# Patient Record
Sex: MALE | Race: WHITE | NOT HISPANIC OR LATINO | Employment: FULL TIME | URBAN - METROPOLITAN AREA
[De-identification: names, ages, dates, MRNs, and addresses within clinical notes are randomized per-mention and may not be internally consistent; named-entity substitution may affect disease eponyms.]

---

## 2017-03-05 ENCOUNTER — OFFICE VISIT (OUTPATIENT)
Dept: URGENT CARE | Facility: CLINIC | Age: 37
End: 2017-03-05
Payer: COMMERCIAL

## 2017-03-05 VITALS
DIASTOLIC BLOOD PRESSURE: 76 MMHG | BODY MASS INDEX: 31.83 KG/M2 | WEIGHT: 235 LBS | RESPIRATION RATE: 14 BRPM | SYSTOLIC BLOOD PRESSURE: 118 MMHG | HEIGHT: 72 IN | HEART RATE: 74 BPM | TEMPERATURE: 98.6 F | OXYGEN SATURATION: 97 %

## 2017-03-05 DIAGNOSIS — L08.9 LOCAL SKIN INFECTION: ICD-10-CM

## 2017-03-05 PROCEDURE — 99204 OFFICE O/P NEW MOD 45 MIN: CPT | Performed by: PHYSICIAN ASSISTANT

## 2017-03-05 RX ORDER — SULFAMETHOXAZOLE AND TRIMETHOPRIM 800; 160 MG/1; MG/1
1 TABLET ORAL 2 TIMES DAILY
Qty: 20 TAB | Refills: 0 | Status: SHIPPED | OUTPATIENT
Start: 2017-03-05 | End: 2017-03-15

## 2017-03-05 NOTE — MR AVS SNAPSHOT
Cortez Watters   3/5/2017 12:45 PM   Office Visit   MRN: [id number]    Department:  [facility]   Dept Phone:  [phone]    Description:  Male : 1980   Provider:  Aleisha Castellanos PA-C           Reason for Visit     Otalgia l ear swelling x 4 days .      Allergies as of 3/5/2017     No Known Allergies      You were diagnosed with     Local skin infection   [[id number]]         Vital Signs     Blood Pressure Pulse Temperature Respirations Height Weight    118/76 mmHg 74 37 °C (98.6 °F) 14 1.829 m (6') 106.595 kg (235 lb)    Body Mass Index Oxygen Saturation                31.86 kg/m2 97%          Basic Information     Date Of Birth Sex Race Ethnicity Preferred Language    1980 Male White Non- English      Problem List              ICD-10-CM Priority Class Noted - Resolved    Exercise-induced bronchospasm J45.990   Unknown - Present    Benign heart murmur R01.0   Unknown - Present    Migraine headache G43.909   2013 - Present      Health Maintenance        Date Due Completion Dates    IMM DTaP/Tdap/Td Vaccine (1 - Tdap) 1999 ---    IMM INFLUENZA (1) 2016 ---            Current Immunizations     No immunizations on file.      Below and/or attached are the medications your provider expects you to take. Review all of your home medications and newly ordered medications with your provider and/or pharmacist. Follow medication instructions as directed by your provider and/or pharmacist. Please keep your medication list with you and share with your provider. Update the information when medications are discontinued, doses are changed, or new medications (including over-the-counter products) are added; and carry medication information at all times in the event of emergency situations     Allergies:  No Known Allergies          Medications  Valid as of: 2017 -  2:03 PM    Generic Name Brand Name Tablet Size Instructions for use    Albuterol Sulfate (Aero  Soln) albuterol 108 (90 BASE) MCG/ACT Inhale 2 Puffs by mouth every four hours as needed for Shortness of Breath.        Budesonide-Formoterol Fumarate (Aerosol) SYMBICORT 80-4.5 MCG/ACT Inhale 2 Puffs by mouth 2 Times a Day.        Loratadine (Tab) CLARITIN 10 MG Take 10 mg by mouth every day.        Naproxen (Tab) NAPROSYN 500 MG Take 1 Tab by mouth 1 time daily as needed.        Omeprazole (CAPSULE DELAYED RELEASE) PRILOSEC 20 MG Take 1 Cap by mouth every day.        Sulfamethoxazole-Trimethoprim (Tab) BACTRIM -160 MG Take 1 Tab by mouth 2 times a day for 10 days.        SUMAtriptan Succinate (Tab) IMITREX 100 MG Take 1 Tab by mouth Once PRN for Migraine for 1 dose.        SUMAtriptan Succinate (Tab) IMITREX 100 MG Take 1 Tab by mouth Once PRN for Migraine for 1 dose.        Sumatriptan-Naproxen Sodium (Tab) Sumatriptan-Naproxen Sodium  MG Take 1 Tab by mouth 1 time daily as needed.        .                 Medicines prescribed today were sent to:     [facility] - [place], [place] - [address]    [address] [place] [address]    Phone: [phone] Fax: [phone]    Open 24 Hours?: No      Medication refill instructions:       If your prescription bottle indicates you have medication refills left, it is not necessary to call your provider’s office. Please contact your pharmacy and they will refill your medication.    If your prescription bottle indicates you do not have any refills left, you may request refills at any time through one of the following ways: The online [employer] system (except Urgent Care), by calling your provider’s office, or by asking your pharmacy to contact your provider’s office with a refill request. Medication refills are processed only during regular business hours and may not be available until the next business day. Your provider may request additional information or to have a follow-up visit with you prior to refilling your medication.   *Please Note: Medication refills are  assigned a new Rx number when refilled electronically. Your pharmacy may indicate that no refills were authorized even though a new prescription for the same medication is available at the pharmacy. Please request the medicine by name with the pharmacy before contacting your provider for a refill.           MyChart Access Code: Activation code not generated  Current [facility] Status: Active

## 2017-03-08 ASSESSMENT — ENCOUNTER SYMPTOMS
SORE THROAT: 0
FEVER: 0
COUGH: 0

## 2017-03-08 NOTE — PROGRESS NOTES
Subjective:      Cortez Watters is a 36 y.o. male who presents with Otalgia    PMH:  has a past medical history of Benign heart murmur (congenital); Exercise-induced bronchospasm; and Migraine.  MEDS:   Current outpatient prescriptions:   •  sulfamethoxazole-trimethoprim (BACTRIM DS) 800-160 MG tablet, Take 1 Tab by mouth 2 times a day for 10 days., Disp: 20 Tab, Rfl: 0  •  budesonide-formoterol (SYMBICORT) 80-4.5 MCG/ACT AERO, Inhale 2 Puffs by mouth 2 Times a Day., Disp: , Rfl:   •  loratadine (CLARITIN) 10 MG TABS, Take 10 mg by mouth every day., Disp: , Rfl:   •  omeprazole (PRILOSEC) 20 MG CPDR, Take 1 Cap by mouth every day., Disp: 30 Cap, Rfl: 2  •  sumatriptan (IMITREX) 100 MG tablet, Take 1 Tab by mouth Once PRN for Migraine for 1 dose., Disp: 10 Tab, Rfl: 3  •  naproxen (NAPROSYN) 500 MG TABS, Take 1 Tab by mouth 1 time daily as needed., Disp: 30 Tab, Rfl: 2  •  sumatriptan (IMITREX) 100 MG tablet, Take 1 Tab by mouth Once PRN for Migraine for 1 dose., Disp: 10 Tab, Rfl: 3  •  Sumatriptan-Naproxen Sodium (TREXIMET)  MG TABS, Take 1 Tab by mouth 1 time daily as needed., Disp: 9 Tab, Rfl: 12  •  albuterol (VENTOLIN HFA) 108 (90 BASE) MCG/ACT AERS, Inhale 2 Puffs by mouth every four hours as needed for Shortness of Breath., Disp: 1 Inhaler, Rfl: 3  ALLERGIES: No Known Allergies  SURGHX: History reviewed. No pertinent past surgical history.  SOCHX:  reports that he has never smoked. He does not have any smokeless tobacco history on file. He reports that he drinks alcohol.  FH: family history includes Cancer in his paternal uncle; Heart Disease in his mother. There is no history of Diabetes or Stroke. Reviewed with patient/family. Not pertinent to this complaint.            HPI Comments: Patient presents with:  Otalgia: l ear swelling x 4 days .        Otalgia   There is pain in the right ear. This is a new problem. The current episode started in the past 7 days. The problem occurs  constantly. The problem has been gradually worsening. There has been no fever. The pain is at a severity of 4/10. Associated symptoms include a rash. Pertinent negatives include no coughing, ear discharge, hearing loss or sore throat. He has tried nothing for the symptoms. The treatment provided no relief. There is no history of a chronic ear infection.       Review of Systems   Constitutional: Negative for fever.   HENT: Positive for ear pain. Negative for congestion, ear discharge, hearing loss and sore throat.    Respiratory: Negative for cough.    Skin: Positive for rash.   All other systems reviewed and are negative.         Objective:     /76 mmHg  Pulse 74  Temp(Src) 37 °C (98.6 °F)  Resp 14  Ht 1.829 m (6')  Wt 106.595 kg (235 lb)  BMI 31.86 kg/m2  SpO2 97%     Physical Exam   Constitutional: He is oriented to person, place, and time. He appears well-developed and well-nourished. No distress.   HENT:   Head: Normocephalic. Head is without Leblanc's sign and without contusion.       Right Ear: Tympanic membrane normal. There is swelling and tenderness. No drainage. No middle ear effusion. No decreased hearing is noted.   Left Ear: Tympanic membrane normal. No decreased hearing is noted.   Ears:    Mouth/Throat: Oropharynx is clear and moist.   Eyes: Conjunctivae and EOM are normal. Pupils are equal, round, and reactive to light.   Neck: Normal range of motion. Neck supple.   Cardiovascular: Normal rate, regular rhythm and normal heart sounds.    Pulmonary/Chest: Effort normal and breath sounds normal.   Musculoskeletal: Normal range of motion.   Neurological: He is alert and oriented to person, place, and time.   Skin: Skin is warm and dry.   Psychiatric: He has a normal mood and affect.   Nursing note and vitals reviewed.              Assessment/Plan:     1. Local skin infection     - sulfamethoxazole-trimethoprim (BACTRIM DS) 800-160 MG tablet; Take 1 Tab by mouth 2 times a day for 10 days.   Dispense: 20 Tab; Refill: 0    PT can use warm compress on affected ear for relief of symptoms.     PT should follow up with PCP in 1-2 days for re-evaluation if symptoms have not improved.  Discussed red flags and reasons to return to UC or ED.  Pt and/or family verbalized understanding of diagnosis and follow up instructions and declined  informational handout on diagnosis.  PT discharged.

## 2017-04-24 ENCOUNTER — RX ONLY (OUTPATIENT)
Age: 37
Setting detail: RX ONLY
End: 2017-04-24

## 2017-05-08 PROBLEM — D49.2 NEOPLASM OF UNSPECIFIED BEHAVIOR OF BONE, SOFT TISSUE, AND SKIN: Status: RESOLVED | Noted: 2017-04-24 | Resolved: 2017-05-08

## 2017-05-26 PROBLEM — D49.2 NEOPLASM OF UNSPECIFIED BEHAVIOR OF BONE, SOFT TISSUE, AND SKIN: Status: ACTIVE | Noted: 2017-05-26

## 2019-05-02 ENCOUNTER — TELEPHONE (OUTPATIENT)
Dept: SCHEDULING | Facility: IMAGING CENTER | Age: 39
End: 2019-05-02

## 2019-05-24 ENCOUNTER — OFFICE VISIT (OUTPATIENT)
Dept: MEDICAL GROUP | Facility: MEDICAL CENTER | Age: 39
End: 2019-05-24
Payer: OTHER GOVERNMENT

## 2019-05-24 VITALS
OXYGEN SATURATION: 97 % | HEIGHT: 72 IN | DIASTOLIC BLOOD PRESSURE: 90 MMHG | WEIGHT: 243.6 LBS | RESPIRATION RATE: 16 BRPM | BODY MASS INDEX: 33 KG/M2 | TEMPERATURE: 98.5 F | SYSTOLIC BLOOD PRESSURE: 140 MMHG | HEART RATE: 64 BPM

## 2019-05-24 DIAGNOSIS — R03.0 ELEVATED BLOOD PRESSURE READING: ICD-10-CM

## 2019-05-24 DIAGNOSIS — R21 RASH: ICD-10-CM

## 2019-05-24 DIAGNOSIS — Z00.00 HEALTHCARE MAINTENANCE: ICD-10-CM

## 2019-05-24 DIAGNOSIS — L29.9 SCALP ITCH: ICD-10-CM

## 2019-05-24 DIAGNOSIS — J02.9 SORE THROAT: ICD-10-CM

## 2019-05-24 DIAGNOSIS — H04.129 DRY EYE: ICD-10-CM

## 2019-05-24 LAB
INT CON NEG: NEGATIVE
INT CON POS: POSITIVE
S PYO AG THROAT QL: NEGATIVE

## 2019-05-24 PROCEDURE — 87880 STREP A ASSAY W/OPTIC: CPT | Performed by: FAMILY MEDICINE

## 2019-05-24 PROCEDURE — 99214 OFFICE O/P EST MOD 30 MIN: CPT | Performed by: FAMILY MEDICINE

## 2019-05-24 RX ORDER — KETOCONAZOLE 20 MG/G
CREAM TOPICAL
Qty: 1 TUBE | Refills: 1 | Status: SHIPPED | OUTPATIENT
Start: 2019-05-24

## 2019-05-24 RX ORDER — TRIAMCINOLONE ACETONIDE 5 MG/G
OINTMENT TOPICAL
Qty: 1 TUBE | Refills: 1 | Status: SHIPPED | OUTPATIENT
Start: 2019-05-24 | End: 2021-03-25

## 2019-05-24 RX ORDER — BEPOTASTINE BESILATE 15 MG/ML
SOLUTION/ DROPS OPHTHALMIC
COMMUNITY

## 2019-05-24 RX ORDER — ATORVASTATIN CALCIUM 10 MG/1
10 TABLET, FILM COATED ORAL NIGHTLY
COMMUNITY
End: 2020-01-13 | Stop reason: SDUPTHER

## 2019-05-24 ASSESSMENT — PATIENT HEALTH QUESTIONNAIRE - PHQ9: CLINICAL INTERPRETATION OF PHQ2 SCORE: 0

## 2019-05-24 NOTE — ASSESSMENT & PLAN NOTE
Discoid rash on right dorsal hand. Started in Dec 2018 while he was in [place]. He was having significant allergies and issues with is skin at that time. [facility] with no improvement. Also gets similar areas on legs, abd, scrutum and ear. However, no other rashes currently other than his hand.

## 2019-05-24 NOTE — ASSESSMENT & PLAN NOTE
148/100 on initial check.   Repeat was 140/90.   Patient has cuff at home.   No chest pain, SOB or headache.

## 2019-05-24 NOTE — PROGRESS NOTES
Subjective:     CC:  Diagnoses of Rash, Sore throat, Scalp itch, Healthcare maintenance, Elevated blood pressure reading, and Dry eye were pertinent to this visit.    HISTORY OF THE PRESENT ILLNESS: Patient is a 38 y.o. male who presents to the clinic today to establish care.    Rash  Discoid rash on right dorsal hand.  Started as erythematous, now scaly and very itchy.  Gets blisters every now and then from itching.  Started in Dec 2018 while he was in [place]. He was having significant allergies and issues with his skin at that time. [facility] with no improvement. Also gets similar areas on legs, abd, scrutum and ear.    Sore throat  Started 2 days ago. Assoc with slight cough and congestion. No fevers or chills. No sick contacts. He has been running outside recently. He does have a h/o seasonal allergies. Currently on loratadine with improvement, allergies are well controlled.     Scalp itch  Gets very itchy, gets red bumps and bulges as well. Currently using a 2 in 1 shampoo- pantene.     Healthcare maintenance  [facility]   States he is up-to-date on immunizations.    Elevated blood pressure reading  148/100 on initial check.   Repeat was 140/90.   Patient has cuff at home.   No chest pain, SOB or headache.       Allergies: Patient has no known allergies.    Current Outpatient Prescriptions Ordered in [facility]   Medication Sig Dispense Refill   • atorvastatin (LIPITOR) 10 MG Tab Take 10 mg by mouth every evening.     • Lifitegrast (XIIDRA) 5 % Solution by Ophthalmic route.     • Bepotastine Besilate (BEPREVE) 1.5 % Solution by Ophthalmic route.     • ketoconazole (NIZORAL) 2 % Cream Apply small amount on affected areas BID x 2 weeks or until rash resolves. 1 Tube 1   • sumatriptan (IMITREX) 100 MG tablet Take 1 Tab by mouth Once PRN for Migraine for 1 dose. 10 Tab 3   • budesonide-formoterol (SYMBICORT) 80-4.5 MCG/ACT AERO Inhale 2  Puffs by mouth 2 Times a Day.     • loratadine (CLARITIN) 10 MG TABS Take 10 mg by mouth every day.     • omeprazole (PRILOSEC) 20 MG CPDR Take 1 Cap by mouth every day. 30 Cap 2   • albuterol (VENTOLIN HFA) 108 (90 BASE) MCG/ACT AERS Inhale 2 Puffs by mouth every four hours as needed for Shortness of Breath. 1 Inhaler 3     No current Epic-ordered facility-administered medications on file.        Past Medical History:   Diagnosis Date   • Asthma    • Benign heart murmur congenital   • Exercise-induced bronchospasm    • Hyperlipidemia    • Migraine        Past Surgical History:   Procedure Laterality Date   • CYST EXCISION     • EYE SURGERY      LASIK       Social History   Substance Use Topics   • Smoking status: Never Smoker   • Smokeless tobacco: Never Used   • Alcohol use Yes      Comment: occasionally       Social History     Social History Narrative   • No narrative on file       Family History   Problem Relation Age of Onset   • Heart Disease Mother         age 50   • Heart Attack Mother    • Cancer Paternal Uncle    • No Known Problems Father    • Diabetes Neg Hx    • Stroke Neg Hx        Health Maintenance: Completed    ROS:   Gen: no fevers/chills, no changes in weight  Eyes: no changes in vision  ENT: + sore throat, no hearing loss, no bloody nose  Pulm: no sob, no cough  CV: no chest pain, no palpitations  GI: no nausea/vomiting, no diarrhea  : no dysuria  MSk: no myalgias  Skin:+ rash  Neuro: no headaches, no numbness/tingling  Heme/Lymph: no easy bruising             Objective:       Exam: /90 (BP Location: Right arm, Patient Position: Sitting, BP Cuff Size: Adult)   Pulse 64   Temp 36.9 °C (98.5 °F) (Temporal)   Resp 16   Ht 1.829 m (6')   Wt 110.5 kg (243 lb 9.6 oz)   SpO2 97%  Body mass index is 33.04 kg/m².    General: Normal appearing. No distress.  HEENT: conjunctiva clear, lids without ptosis, pupils equal  and reactive to light, ears normal shape and contour, canals are clear  bilaterally, TMs clear, erythema noted in the oropharynx, hypertrophy of the bilateral tonsils, no exudate.  Neck: Supple without masses. Thyroid is not enlarged.  Pulmonary: Clear to ausculation.  Normal effort. No rales, ronchi, or wheezing.  Cardiovascular: Regular rate and rhythm, no murmur. No LE edema  Abdomen: Soft, nontender, nondistended. No masses or hernias noted.  Neurologic: Grossly normal, no focal deficits  Lymph: No cervical or supraclavicular lymph nodes are palpable  Skin: Warm and dry.  Discoid rash on the dorsum of his right hand with slight erythema and scaling.  Another small well-circumscribed area approximately 1 cm x 0.5 cm on his left flank that is similar in appearance.  Musculoskeletal: Normal gait and station.  Psych: Normal mood and affect. Alert and oriented x3. Judgment and insight is normal.      Assessment & Plan:   38 y.o. male with the following -    1. Rash  New problem.  The rash appears to be eczematous in nature.  He had no improvement with triamcinolone ointment however, this was low dose.  I do wonder if this may possibly be fungal in nature.  Plan to try ketoconazole for 2 weeks, if no improvement we will try stronger steroid cream.  Plan to follow-up in 6 weeks.  Referral sent to dermatology.  - REFERRAL TO DERMATOLOGY  - ketoconazole (NIZORAL) 2 % Cream; Apply small amount on affected areas BID x 2 weeks or until rash resolves.  Dispense: 1 Tube; Refill: 1  - Triamcinolone 0.5%    2. Sore throat  New problem.  Edema and erythema noted on bilateral tonsils, no exudate.  Strep was negative.  Advised this is likely a viral infection, advised per therapy.  Return precautions given.  - POCT Rapid Strep A    3. Scalp itch  New problem.  Likely seborrheic dermatitis.  Advised trying to use a shampoo with selenium in it.  To follow-up in 6 weeks.    4. Healthcare maintenance  Patient reports he is up-to-date on immunizations and [facility]  We will  attempt to get these records.    5. Elevated blood pressure reading  New problem.  Blood pressure elevated today at 140/90.  Advised patient to start checking blood pressures at home.  We also discussed diet and exercise.  He is already exercising regularly but reports a somewhat poor diet.  Plan to follow-up in 6 weeks.    6. Dry eye    - REFERRAL TO OPHTHALMOLOGY      Return in about 6 weeks (around 7/5/2019).    Please note that this dictation was created using voice recognition software. I have made every reasonable attempt to correct obvious errors, but I expect that there are errors of grammar and possibly content that I did not discover before finalizing the note.

## 2019-05-24 NOTE — ASSESSMENT & PLAN NOTE
Started 2 days ago. Assoc with slight cough and congestion. No fevers or chills. No sick contacts. He has been running outside recently. He does have a h/o seasonal allergies. Currently on loratadine with improvement, allergies are well controlled.

## 2019-07-15 ENCOUNTER — OFFICE VISIT (OUTPATIENT)
Dept: MEDICAL GROUP | Facility: MEDICAL CENTER | Age: 39
End: 2019-07-15
Payer: OTHER GOVERNMENT

## 2019-07-15 VITALS
OXYGEN SATURATION: 100 % | HEART RATE: 65 BPM | WEIGHT: 244.2 LBS | BODY MASS INDEX: 33.08 KG/M2 | HEIGHT: 72 IN | DIASTOLIC BLOOD PRESSURE: 90 MMHG | SYSTOLIC BLOOD PRESSURE: 132 MMHG | TEMPERATURE: 97.8 F

## 2019-07-15 DIAGNOSIS — G43.909 MIGRAINE WITHOUT STATUS MIGRAINOSUS, NOT INTRACTABLE, UNSPECIFIED MIGRAINE TYPE: ICD-10-CM

## 2019-07-15 DIAGNOSIS — R21 RASH: ICD-10-CM

## 2019-07-15 DIAGNOSIS — J45.40 MODERATE PERSISTENT ASTHMA WITHOUT COMPLICATION: ICD-10-CM

## 2019-07-15 PROCEDURE — 99214 OFFICE O/P EST MOD 30 MIN: CPT | Performed by: FAMILY MEDICINE

## 2019-07-15 NOTE — PROGRESS NOTES
Subjective:     CC: Diagnoses of Rash, Migraine without status migrainosus, not intractable, unspecified migraine type, and Moderate persistent asthma without complication were pertinent to this visit.    HPI: Patient is a 38 y.o. male established patient who presents today to follow-up.  He also requests a letter for the [employer] stating that he is fit for duty.      Rash  Chronic problem.  Significant improvement with triamcinolone 0.5%. Not having to use the triamcinolone. Has been cleared up for a while now.     Migraine headache  Chronic problem.  The patient generally uses over-the-counter Advil.  If this is not helpful he does have a prescription for sumatriptan, he only needs this rarely.  He feels his migraines are essentially well controlled.    Moderate persistent asthma without complication  Chronic problem.  Well-controlled.  Patient denies any shortness of breath or wheezing.  He is currently on Symbicort 2 puffs daily.  He reports he is able to exercise without issue when he is taking his medications.      Past Medical History:   Diagnosis Date   • Asthma    • Benign heart murmur congenital   • Exercise-induced bronchospasm    • Hyperlipidemia    • Migraine        Social History   Substance Use Topics   • Smoking status: Never Smoker   • Smokeless tobacco: Never Used   • Alcohol use Yes      Comment: occasionally       Current Outpatient Prescriptions Ordered in [facility]   Medication Sig Dispense Refill   • atorvastatin (LIPITOR) 10 MG Tab Take 10 mg by mouth every evening.     • budesonide-formoterol (SYMBICORT) 80-4.5 MCG/ACT AERO Inhale 2 Puffs by mouth 2 Times a Day.     • loratadine (CLARITIN) 10 MG TABS Take 10 mg by mouth every day.     • omeprazole (PRILOSEC) 20 MG CPDR Take 1 Cap by mouth every day. 30 Cap 2   • Lifitegrast (XIIDRA) 5 % Solution by Ophthalmic route.     • Bepotastine Besilate (BEPREVE) 1.5 % Solution by Ophthalmic route.     • ketoconazole (NIZORAL) 2 % Cream Apply small  "amount on affected areas BID x 2 weeks or until rash resolves. 1 Tube 1   • triamcinolone (ARISTOCORT) 0.5 % ointment Apply small amount to affected areas BID for up to 10 days as needed for rash 1 Tube 1   • sumatriptan (IMITREX) 100 MG tablet Take 1 Tab by mouth Once PRN for Migraine for 1 dose. (Patient not taking: Reported on 7/15/2019) 10 Tab 3   • albuterol (VENTOLIN HFA) 108 (90 BASE) MCG/ACT AERS Inhale 2 Puffs by mouth every four hours as needed for Shortness of Breath. 1 Inhaler 3     No current Epic-ordered facility-administered medications on file.        Allergies:  Patient has no known allergies.    Health Maintenance: Completed    ROS:  Pulm: no sob, no cough  CV: no chest pain, no palpitations      Objective:       Exam:  /90 (BP Location: Left arm, Patient Position: Sitting, BP Cuff Size: Adult long)   Pulse 65   Temp 36.6 °C (97.8 °F) (Temporal)   Ht 1.829 m (6' 0.01\")   Wt 110.8 kg (244 lb 3.2 oz)   SpO2 100%   BMI 33.11 kg/m²  Body mass index is 33.11 kg/m².    General: Normal appearing. No distress.  HEAD: NCAT  EYES: conjunctiva clear, lids without ptosis, pupils equal and reactive to light  EARS: ears normal shape and contour.  MOUTH: normal dentition   Neck:  Normal ROM  Pulmonary: Normal effort. Normal respiratory rate.  Cardiovascular: Well perfused. No LE edema  Neurologic: Grossly normal, no focal deficits  Skin: Warm and dry.  No obvious lesions.  Musculoskeletal: Normal gait and station.   Psych: Normal mood and affect. Alert and oriented x3. Judgment and insight is normal.      Assessment & Plan:     38 y.o. male with the following -     1. Rash  Chronic problem, now improved following triamcinolone 0.5% ointment.  Likely due to eczema.    2. Migraine without status migrainosus, not intractable, unspecified migraine type  Chronic problem.  Generally controlled with Advil or sumatriptan if needed.    3. Moderate persistent asthma without complication  Chronic problem.  " Generally well controlled on current prescriptions.    Letter written today for the [employer] regarding his ability to serve.  Patient states he is well, medical problems are well controlled.    Return in about 1 year (around 7/15/2020), or if symptoms worsen or fail to improve.    Please note that this dictation was created using voice recognition software. I have made every reasonable attempt to correct obvious errors, but I expect that there are errors of grammar and possibly content that I did not discover before finalizing the note.

## 2019-07-15 NOTE — LETTER
[facility]  Renata Hernandez M.D.  [address]  [place] [address]  Fax: [phone]   Authorization for Release/Disclosure of   Protected Health Information   Name: CORTEZ WATTERS : 1980 SSN: [id number]   Address: [address] [place] [address]  [place] [address] Phone:    [phone] (home)    I authorize the entity listed below to release/disclose the PHI below to:   [facility]/Renata Hernandez M.D. and Renata Hernandez M.D.   Provider or Entity Name: [email or web address]     Address   City, State, Zip   Phone:      Fax:     Reason for request: continuity of care   Information to be released:    [  ] LAST COLONOSCOPY,  including any PATH REPORT and follow-up  [  ] LAST FIT/COLOGUARD RESULT [  ] LAST DEXA  [  ] LAST MAMMOGRAM  [  ] LAST PAP  [  ] LAST LABS [  ] RETINA EXAM REPORT  [  ] IMMUNIZATION RECORDS  [xxxxx  ] Release all info      [  ] Check here and initial the line next to each item to release ALL health information INCLUDING  _____ Care and treatment for drug and / or alcohol abuse  _____ HIV testing, infection status, or AIDS  _____ Genetic Testing    DATES OF SERVICE OR TIME PERIOD TO BE DISCLOSED: _____________  I understand and acknowledge that:  * This Authorization may be revoked at any time by you in writing, except if your health information has already been used or disclosed.  * Your health information that will be used or disclosed as a result of you signing this authorization could be re-disclosed by the recipient. If this occurs, your re-disclosed health information may no longer be protected by State or Federal laws.  * You may refuse to sign this Authorization. Your refusal will not affect your ability to obtain treatment.  * This Authorization becomes effective upon signing and will  on (date) __________.      If no date is indicated, this Authorization will  one (1) year from the signature date.    Name: Cortez Watters    Signature:   Date:     7/15/2019       PLEASE FAX REQUESTED RECORDS BACK TO: [phone]

## 2019-07-15 NOTE — LETTER
July 15, 2019    To Whom It May Concern:         This is confirmation that Cortez Watters attended his scheduled appointment with Renata Hernandez M.D. on 7/15/19.    He did have a rash that was likely due to eczema, this resolved with 0.5% triamcinolone ointment. He is now doing well.     The patient has asthma that was exacerbated by poor air quality in [facility]. He is now doing well, has no further shortness of breath or wheezing on his maintenance inhaler.    The patient gets occasional headaches, improve well with over-the-counter Ibuprofen usually, otherwise is able to use Sumatriptan for headaches.    The patient does not feel that he is in harm's way any longer. Reports a normal mood and his occasional irritability seems to be at baseline.            If you have any questions please do not hesitate to call me at the phone number listed below.    Sincerely,          Renata Hernandez M.D.  [phone]

## 2019-07-15 NOTE — ASSESSMENT & PLAN NOTE
Chronic problem.  Well-controlled.  Patient denies any shortness of breath or wheezing.  He is currently on Symbicort 2 puffs daily.  He reports he is able to exercise without issue when he is taking his medications.

## 2019-07-15 NOTE — ASSESSMENT & PLAN NOTE
Chronic problem.  Significant improvement with triamcinolone 0.5%. Not having to use the triamcinolone. Has been cleared up for a while now.

## 2019-07-15 NOTE — ASSESSMENT & PLAN NOTE
Chronic problem.  The patient generally uses over-the-counter Advil.  If this is not helpful he does have a prescription for sumatriptan, he only needs this rarely.  He feels his migraines are essentially well controlled.

## 2019-09-27 ENCOUNTER — PATIENT MESSAGE (OUTPATIENT)
Dept: MEDICAL GROUP | Facility: MEDICAL CENTER | Age: 39
End: 2019-09-27

## 2019-09-27 DIAGNOSIS — R06.83 SNORING: ICD-10-CM

## 2019-09-27 DIAGNOSIS — J02.9 SORE THROAT: ICD-10-CM

## 2020-01-06 RX ORDER — FLUTICASONE PROPIONATE 50 MCG
2 SPRAY, SUSPENSION (ML) NASAL DAILY
COMMUNITY

## 2020-01-06 RX ORDER — M-VIT,TX,IRON,MINS/CALC/FOLIC 27MG-0.4MG
1 TABLET ORAL DAILY
COMMUNITY

## 2020-01-07 ENCOUNTER — ANESTHESIA (OUTPATIENT)
Dept: SURGERY | Facility: MEDICAL CENTER | Age: 40
End: 2020-01-07
Payer: OTHER GOVERNMENT

## 2020-01-07 ENCOUNTER — HOSPITAL ENCOUNTER (OUTPATIENT)
Facility: MEDICAL CENTER | Age: 40
End: 2020-01-08
Attending: OTOLARYNGOLOGY | Admitting: OTOLARYNGOLOGY
Payer: OTHER GOVERNMENT

## 2020-01-07 ENCOUNTER — ANESTHESIA EVENT (OUTPATIENT)
Dept: SURGERY | Facility: MEDICAL CENTER | Age: 40
End: 2020-01-07
Payer: OTHER GOVERNMENT

## 2020-01-07 DIAGNOSIS — G89.18 POSTOPERATIVE PAIN: ICD-10-CM

## 2020-01-07 LAB — PATHOLOGY CONSULT NOTE: NORMAL

## 2020-01-07 PROCEDURE — A9270 NON-COVERED ITEM OR SERVICE: HCPCS | Performed by: ANESTHESIOLOGY

## 2020-01-07 PROCEDURE — G0378 HOSPITAL OBSERVATION PER HR: HCPCS

## 2020-01-07 PROCEDURE — 700111 HCHG RX REV CODE 636 W/ 250 OVERRIDE (IP)

## 2020-01-07 PROCEDURE — 700105 HCHG RX REV CODE 258: Performed by: OTOLARYNGOLOGY

## 2020-01-07 PROCEDURE — 88304 TISSUE EXAM BY PATHOLOGIST: CPT

## 2020-01-07 PROCEDURE — 501838 HCHG SUTURE GENERAL: Performed by: OTOLARYNGOLOGY

## 2020-01-07 PROCEDURE — 160035 HCHG PACU - 1ST 60 MINS PHASE I: Performed by: OTOLARYNGOLOGY

## 2020-01-07 PROCEDURE — 700111 HCHG RX REV CODE 636 W/ 250 OVERRIDE (IP): Performed by: ANESTHESIOLOGY

## 2020-01-07 PROCEDURE — A9270 NON-COVERED ITEM OR SERVICE: HCPCS | Performed by: OTOLARYNGOLOGY

## 2020-01-07 PROCEDURE — 501424 HCHG SPONGE, TONSIL: Performed by: OTOLARYNGOLOGY

## 2020-01-07 PROCEDURE — 700101 HCHG RX REV CODE 250: Performed by: ANESTHESIOLOGY

## 2020-01-07 PROCEDURE — 700102 HCHG RX REV CODE 250 W/ 637 OVERRIDE(OP): Performed by: ANESTHESIOLOGY

## 2020-01-07 PROCEDURE — 700102 HCHG RX REV CODE 250 W/ 637 OVERRIDE(OP): Performed by: OTOLARYNGOLOGY

## 2020-01-07 PROCEDURE — 502573 HCHG PACK, ENT: Performed by: OTOLARYNGOLOGY

## 2020-01-07 PROCEDURE — 500331 HCHG COTTONOID, SURG PATTIE: Performed by: OTOLARYNGOLOGY

## 2020-01-07 PROCEDURE — 160029 HCHG SURGERY MINUTES - 1ST 30 MINS LEVEL 4: Performed by: OTOLARYNGOLOGY

## 2020-01-07 PROCEDURE — 500257: Performed by: OTOLARYNGOLOGY

## 2020-01-07 PROCEDURE — C1894 INTRO/SHEATH, NON-LASER: HCPCS | Performed by: OTOLARYNGOLOGY

## 2020-01-07 PROCEDURE — 160048 HCHG OR STATISTICAL LEVEL 1-5: Performed by: OTOLARYNGOLOGY

## 2020-01-07 PROCEDURE — 160009 HCHG ANES TIME/MIN: Performed by: OTOLARYNGOLOGY

## 2020-01-07 PROCEDURE — 160002 HCHG RECOVERY MINUTES (STAT): Performed by: OTOLARYNGOLOGY

## 2020-01-07 PROCEDURE — 160036 HCHG PACU - EA ADDL 30 MINS PHASE I: Performed by: OTOLARYNGOLOGY

## 2020-01-07 PROCEDURE — 700101 HCHG RX REV CODE 250: Performed by: OTOLARYNGOLOGY

## 2020-01-07 PROCEDURE — 160041 HCHG SURGERY MINUTES - EA ADDL 1 MIN LEVEL 4: Performed by: OTOLARYNGOLOGY

## 2020-01-07 RX ORDER — SCOLOPAMINE TRANSDERMAL SYSTEM 1 MG/1
1 PATCH, EXTENDED RELEASE TRANSDERMAL
Status: DISCONTINUED | OUTPATIENT
Start: 2020-01-07 | End: 2020-01-08 | Stop reason: HOSPADM

## 2020-01-07 RX ORDER — MIDAZOLAM HYDROCHLORIDE 1 MG/ML
INJECTION INTRAMUSCULAR; INTRAVENOUS PRN
Status: DISCONTINUED | OUTPATIENT
Start: 2020-01-07 | End: 2020-01-07 | Stop reason: SURG

## 2020-01-07 RX ORDER — CELECOXIB 200 MG/1
400 CAPSULE ORAL ONCE
Status: COMPLETED | OUTPATIENT
Start: 2020-01-07 | End: 2020-01-07

## 2020-01-07 RX ORDER — HALOPERIDOL 5 MG/ML
1 INJECTION INTRAMUSCULAR
Status: DISCONTINUED | OUTPATIENT
Start: 2020-01-07 | End: 2020-01-07 | Stop reason: HOSPADM

## 2020-01-07 RX ORDER — BUDESONIDE AND FORMOTEROL FUMARATE DIHYDRATE 80; 4.5 UG/1; UG/1
2 AEROSOL RESPIRATORY (INHALATION)
Status: DISCONTINUED | OUTPATIENT
Start: 2020-01-07 | End: 2020-01-08 | Stop reason: HOSPADM

## 2020-01-07 RX ORDER — SODIUM CHLORIDE, SODIUM LACTATE, POTASSIUM CHLORIDE, CALCIUM CHLORIDE 600; 310; 30; 20 MG/100ML; MG/100ML; MG/100ML; MG/100ML
INJECTION, SOLUTION INTRAVENOUS CONTINUOUS
Status: ACTIVE | OUTPATIENT
Start: 2020-01-07 | End: 2020-01-07

## 2020-01-07 RX ORDER — PREDNISONE 20 MG/1
20 TABLET ORAL DAILY
Qty: 5 TAB | Refills: 0 | Status: SHIPPED | OUTPATIENT
Start: 2020-01-07 | End: 2020-01-12

## 2020-01-07 RX ORDER — ACETAMINOPHEN 500 MG
1000 TABLET ORAL ONCE
Status: COMPLETED | OUTPATIENT
Start: 2020-01-07 | End: 2020-01-07

## 2020-01-07 RX ORDER — DEXTROSE MONOHYDRATE, SODIUM CHLORIDE, AND POTASSIUM CHLORIDE 50; 1.49; 4.5 G/1000ML; G/1000ML; G/1000ML
INJECTION, SOLUTION INTRAVENOUS CONTINUOUS
Status: ACTIVE | OUTPATIENT
Start: 2020-01-07 | End: 2020-01-07

## 2020-01-07 RX ORDER — CELECOXIB 200 MG/1
CAPSULE ORAL
Status: COMPLETED
Start: 2020-01-07 | End: 2020-01-07

## 2020-01-07 RX ORDER — MIDAZOLAM HYDROCHLORIDE 1 MG/ML
1 INJECTION INTRAMUSCULAR; INTRAVENOUS
Status: DISCONTINUED | OUTPATIENT
Start: 2020-01-07 | End: 2020-01-07 | Stop reason: HOSPADM

## 2020-01-07 RX ORDER — ECHINACEA PURPUREA EXTRACT 125 MG
2 TABLET ORAL
Status: DISCONTINUED | OUTPATIENT
Start: 2020-01-07 | End: 2020-01-08 | Stop reason: HOSPADM

## 2020-01-07 RX ORDER — ONDANSETRON 2 MG/ML
INJECTION INTRAMUSCULAR; INTRAVENOUS PRN
Status: DISCONTINUED | OUTPATIENT
Start: 2020-01-07 | End: 2020-01-07 | Stop reason: SURG

## 2020-01-07 RX ORDER — CEFAZOLIN SODIUM 1 G/3ML
INJECTION, POWDER, FOR SOLUTION INTRAMUSCULAR; INTRAVENOUS PRN
Status: DISCONTINUED | OUTPATIENT
Start: 2020-01-07 | End: 2020-01-07 | Stop reason: SURG

## 2020-01-07 RX ORDER — DEXAMETHASONE SODIUM PHOSPHATE 4 MG/ML
10 INJECTION, SOLUTION INTRA-ARTICULAR; INTRALESIONAL; INTRAMUSCULAR; INTRAVENOUS; SOFT TISSUE ONCE
Status: COMPLETED | OUTPATIENT
Start: 2020-01-08 | End: 2020-01-08

## 2020-01-07 RX ORDER — ROCURONIUM BROMIDE 10 MG/ML
INJECTION, SOLUTION INTRAVENOUS PRN
Status: DISCONTINUED | OUTPATIENT
Start: 2020-01-07 | End: 2020-01-07 | Stop reason: SURG

## 2020-01-07 RX ORDER — OXYCODONE HCL 5 MG/5 ML
10 SOLUTION, ORAL ORAL
Status: DISCONTINUED | OUTPATIENT
Start: 2020-01-07 | End: 2020-01-07 | Stop reason: HOSPADM

## 2020-01-07 RX ORDER — LIDOCAINE HYDROCHLORIDE 20 MG/ML
INJECTION, SOLUTION EPIDURAL; INFILTRATION; INTRACAUDAL; PERINEURAL PRN
Status: DISCONTINUED | OUTPATIENT
Start: 2020-01-07 | End: 2020-01-07 | Stop reason: SURG

## 2020-01-07 RX ORDER — LIDOCAINE HYDROCHLORIDE AND EPINEPHRINE 10; 10 MG/ML; UG/ML
INJECTION, SOLUTION INFILTRATION; PERINEURAL
Status: DISCONTINUED | OUTPATIENT
Start: 2020-01-07 | End: 2020-01-07 | Stop reason: HOSPADM

## 2020-01-07 RX ORDER — SODIUM CHLORIDE, SODIUM LACTATE, POTASSIUM CHLORIDE, CALCIUM CHLORIDE 600; 310; 30; 20 MG/100ML; MG/100ML; MG/100ML; MG/100ML
INJECTION, SOLUTION INTRAVENOUS CONTINUOUS
Status: DISCONTINUED | OUTPATIENT
Start: 2020-01-07 | End: 2020-01-07 | Stop reason: HOSPADM

## 2020-01-07 RX ORDER — EPINEPHRINE 1 MG/ML(1)
AMPUL (ML) INJECTION
Status: DISCONTINUED | OUTPATIENT
Start: 2020-01-07 | End: 2020-01-07 | Stop reason: HOSPADM

## 2020-01-07 RX ORDER — OXYMETAZOLINE HYDROCHLORIDE 0.05 G/100ML
SPRAY NASAL
Status: COMPLETED
Start: 2020-01-07 | End: 2020-01-07

## 2020-01-07 RX ORDER — MAGNESIUM SULFATE HEPTAHYDRATE 40 MG/ML
INJECTION, SOLUTION INTRAVENOUS PRN
Status: DISCONTINUED | OUTPATIENT
Start: 2020-01-07 | End: 2020-01-07 | Stop reason: SURG

## 2020-01-07 RX ORDER — DIPHENHYDRAMINE HYDROCHLORIDE 50 MG/ML
25 INJECTION INTRAMUSCULAR; INTRAVENOUS EVERY 6 HOURS PRN
Status: DISCONTINUED | OUTPATIENT
Start: 2020-01-07 | End: 2020-01-08 | Stop reason: HOSPADM

## 2020-01-07 RX ORDER — MAGNESIUM SULFATE HEPTAHYDRATE 500 MG/ML
INJECTION, SOLUTION INTRAMUSCULAR; INTRAVENOUS PRN
Status: DISCONTINUED | OUTPATIENT
Start: 2020-01-07 | End: 2020-01-07 | Stop reason: SURG

## 2020-01-07 RX ORDER — IBUPROFEN 600 MG/1
600 TABLET ORAL 3 TIMES DAILY
Status: DISCONTINUED | OUTPATIENT
Start: 2020-01-07 | End: 2020-01-08 | Stop reason: HOSPADM

## 2020-01-07 RX ORDER — EPINEPHRINE 1 MG/ML
INJECTION INTRAMUSCULAR; INTRAVENOUS; SUBCUTANEOUS
Status: COMPLETED
Start: 2020-01-07 | End: 2020-01-07

## 2020-01-07 RX ORDER — OMEPRAZOLE 20 MG/1
20 CAPSULE, DELAYED RELEASE ORAL DAILY
Status: DISCONTINUED | OUTPATIENT
Start: 2020-01-08 | End: 2020-01-08 | Stop reason: HOSPADM

## 2020-01-07 RX ORDER — OXYCODONE HCL 5 MG/5 ML
10 SOLUTION, ORAL ORAL
Status: COMPLETED | OUTPATIENT
Start: 2020-01-07 | End: 2020-01-07

## 2020-01-07 RX ORDER — SCOLOPAMINE TRANSDERMAL SYSTEM 1 MG/1
1 PATCH, EXTENDED RELEASE TRANSDERMAL
Status: DISCONTINUED | OUTPATIENT
Start: 2020-01-07 | End: 2020-01-07 | Stop reason: HOSPADM

## 2020-01-07 RX ORDER — OXYCODONE HCL 5 MG/5 ML
5 SOLUTION, ORAL ORAL
Status: COMPLETED | OUTPATIENT
Start: 2020-01-07 | End: 2020-01-07

## 2020-01-07 RX ORDER — ATORVASTATIN CALCIUM 10 MG/1
10 TABLET, FILM COATED ORAL NIGHTLY
Status: DISCONTINUED | OUTPATIENT
Start: 2020-01-07 | End: 2020-01-08 | Stop reason: HOSPADM

## 2020-01-07 RX ORDER — OXYCODONE HCL 10 MG/1
10 TABLET, FILM COATED, EXTENDED RELEASE ORAL EVERY 12 HOURS
Status: DISCONTINUED | OUTPATIENT
Start: 2020-01-07 | End: 2020-01-07 | Stop reason: HOSPADM

## 2020-01-07 RX ORDER — ALBUTEROL SULFATE 90 UG/1
2 AEROSOL, METERED RESPIRATORY (INHALATION)
Status: DISCONTINUED | OUTPATIENT
Start: 2020-01-07 | End: 2020-01-08 | Stop reason: HOSPADM

## 2020-01-07 RX ORDER — MORPHINE SULFATE 4 MG/ML
2 INJECTION, SOLUTION INTRAMUSCULAR; INTRAVENOUS
Status: DISCONTINUED | OUTPATIENT
Start: 2020-01-07 | End: 2020-01-08 | Stop reason: HOSPADM

## 2020-01-07 RX ORDER — AMOXICILLIN 500 MG/1
500 CAPSULE ORAL EVERY 8 HOURS
Status: DISCONTINUED | OUTPATIENT
Start: 2020-01-07 | End: 2020-01-08 | Stop reason: HOSPADM

## 2020-01-07 RX ORDER — GABAPENTIN 300 MG/1
300 CAPSULE ORAL ONCE
Status: COMPLETED | OUTPATIENT
Start: 2020-01-07 | End: 2020-01-07

## 2020-01-07 RX ORDER — ONDANSETRON 2 MG/ML
4 INJECTION INTRAMUSCULAR; INTRAVENOUS EVERY 4 HOURS PRN
Status: DISCONTINUED | OUTPATIENT
Start: 2020-01-07 | End: 2020-01-08 | Stop reason: HOSPADM

## 2020-01-07 RX ORDER — OXYCODONE HCL 5 MG/5 ML
5 SOLUTION, ORAL ORAL
Status: DISCONTINUED | OUTPATIENT
Start: 2020-01-07 | End: 2020-01-07 | Stop reason: HOSPADM

## 2020-01-07 RX ORDER — HALOPERIDOL 5 MG/ML
1 INJECTION INTRAMUSCULAR EVERY 6 HOURS PRN
Status: DISCONTINUED | OUTPATIENT
Start: 2020-01-07 | End: 2020-01-08 | Stop reason: HOSPADM

## 2020-01-07 RX ORDER — LIDOCAINE HYDROCHLORIDE AND EPINEPHRINE 10; 10 MG/ML; UG/ML
INJECTION, SOLUTION INFILTRATION; PERINEURAL
Status: COMPLETED
Start: 2020-01-07 | End: 2020-01-07

## 2020-01-07 RX ORDER — DEXAMETHASONE SODIUM PHOSPHATE 4 MG/ML
INJECTION, SOLUTION INTRA-ARTICULAR; INTRALESIONAL; INTRAMUSCULAR; INTRAVENOUS; SOFT TISSUE PRN
Status: DISCONTINUED | OUTPATIENT
Start: 2020-01-07 | End: 2020-01-07 | Stop reason: SURG

## 2020-01-07 RX ORDER — DEXAMETHASONE SODIUM PHOSPHATE 4 MG/ML
4 INJECTION, SOLUTION INTRA-ARTICULAR; INTRALESIONAL; INTRAMUSCULAR; INTRAVENOUS; SOFT TISSUE
Status: DISCONTINUED | OUTPATIENT
Start: 2020-01-07 | End: 2020-01-08 | Stop reason: HOSPADM

## 2020-01-07 RX ADMIN — MIDAZOLAM 2 MG: 1 INJECTION INTRAMUSCULAR; INTRAVENOUS at 08:25

## 2020-01-07 RX ADMIN — ROCURONIUM BROMIDE 50 MG: 10 INJECTION, SOLUTION INTRAVENOUS at 08:25

## 2020-01-07 RX ADMIN — ACETAMINOPHEN 1000 MG: 500 TABLET ORAL at 07:37

## 2020-01-07 RX ADMIN — OXYCODONE HYDROCHLORIDE 5 MG: 5 SOLUTION ORAL at 11:46

## 2020-01-07 RX ADMIN — PROPOFOL 200 MG: 10 INJECTION, EMULSION INTRAVENOUS at 08:25

## 2020-01-07 RX ADMIN — ROCURONIUM BROMIDE 20 MG: 10 INJECTION, SOLUTION INTRAVENOUS at 09:37

## 2020-01-07 RX ADMIN — ONDANSETRON 8 MG: 2 INJECTION INTRAMUSCULAR; INTRAVENOUS at 08:25

## 2020-01-07 RX ADMIN — SODIUM CHLORIDE, POTASSIUM CHLORIDE, SODIUM LACTATE AND CALCIUM CHLORIDE: 600; 310; 30; 20 INJECTION, SOLUTION INTRAVENOUS at 10:52

## 2020-01-07 RX ADMIN — Medication 2 SPRAY: at 18:17

## 2020-01-07 RX ADMIN — IBUPROFEN 600 MG: 600 TABLET ORAL at 16:39

## 2020-01-07 RX ADMIN — HYDROCODONE BITARTRATE AND ACETAMINOPHEN 15 ML: 7.5; 325 SOLUTION ORAL at 21:51

## 2020-01-07 RX ADMIN — FENTANYL CITRATE 25 MCG: 50 INJECTION, SOLUTION INTRAMUSCULAR; INTRAVENOUS at 09:29

## 2020-01-07 RX ADMIN — ROCURONIUM BROMIDE 20 MG: 10 INJECTION, SOLUTION INTRAVENOUS at 10:29

## 2020-01-07 RX ADMIN — SCOPALAMINE 1 PATCH: 1 PATCH, EXTENDED RELEASE TRANSDERMAL at 07:35

## 2020-01-07 RX ADMIN — Medication 2 SPRAY: at 16:38

## 2020-01-07 RX ADMIN — OXYCODONE HYDROCHLORIDE 10 MG: 10 TABLET, FILM COATED, EXTENDED RELEASE ORAL at 07:38

## 2020-01-07 RX ADMIN — MAGNESIUM SULFATE IN WATER 4 G: 40 INJECTION, SOLUTION INTRAVENOUS at 08:25

## 2020-01-07 RX ADMIN — GABAPENTIN 300 MG: 300 CAPSULE ORAL at 07:37

## 2020-01-07 RX ADMIN — AMOXICILLIN 500 MG: 500 CAPSULE ORAL at 16:39

## 2020-01-07 RX ADMIN — DEXAMETHASONE SODIUM PHOSPHATE 16 MG: 4 INJECTION, SOLUTION INTRA-ARTICULAR; INTRALESIONAL; INTRAMUSCULAR; INTRAVENOUS; SOFT TISSUE at 08:25

## 2020-01-07 RX ADMIN — FENTANYL CITRATE 25 MCG: 50 INJECTION, SOLUTION INTRAMUSCULAR; INTRAVENOUS at 10:34

## 2020-01-07 RX ADMIN — LIDOCAINE HYDROCHLORIDE 5 ML: 20 INJECTION, SOLUTION EPIDURAL; INFILTRATION; INTRACAUDAL at 08:25

## 2020-01-07 RX ADMIN — SODIUM CHLORIDE, POTASSIUM CHLORIDE, SODIUM LACTATE AND CALCIUM CHLORIDE: 600; 310; 30; 20 INJECTION, SOLUTION INTRAVENOUS at 07:00

## 2020-01-07 RX ADMIN — ATORVASTATIN CALCIUM 10 MG: 10 TABLET, FILM COATED ORAL at 21:51

## 2020-01-07 RX ADMIN — MAGNESIUM SULFATE HEPTAHYDRATE 1 G: 500 INJECTION, SOLUTION INTRAMUSCULAR; INTRAVENOUS at 08:41

## 2020-01-07 RX ADMIN — CEFAZOLIN 2 G: 330 INJECTION, POWDER, FOR SOLUTION INTRAMUSCULAR; INTRAVENOUS at 08:25

## 2020-01-07 RX ADMIN — FENTANYL CITRATE 25 MCG: 50 INJECTION, SOLUTION INTRAMUSCULAR; INTRAVENOUS at 09:01

## 2020-01-07 RX ADMIN — FENTANYL CITRATE 25 MCG: 50 INJECTION, SOLUTION INTRAMUSCULAR; INTRAVENOUS at 08:48

## 2020-01-07 RX ADMIN — SUGAMMADEX 200 MG: 100 INJECTION, SOLUTION INTRAVENOUS at 10:52

## 2020-01-07 RX ADMIN — BUDESONIDE AND FORMOTEROL FUMARATE DIHYDRATE 2 PUFF: 80; 4.5 AEROSOL RESPIRATORY (INHALATION) at 16:38

## 2020-01-07 RX ADMIN — HYDROCODONE BITARTRATE AND ACETAMINOPHEN 15 ML: 7.5; 325 SOLUTION ORAL at 17:50

## 2020-01-07 RX ADMIN — AMOXICILLIN 500 MG: 500 CAPSULE ORAL at 21:51

## 2020-01-07 RX ADMIN — CELECOXIB 400 MG: 200 CAPSULE ORAL at 07:37

## 2020-01-07 RX ADMIN — FENTANYL CITRATE 25 MCG: 0.05 INJECTION, SOLUTION INTRAMUSCULAR; INTRAVENOUS at 11:46

## 2020-01-07 ASSESSMENT — COGNITIVE AND FUNCTIONAL STATUS - GENERAL
SUGGESTED CMS G CODE MODIFIER DAILY ACTIVITY: CH
MOBILITY SCORE: 24
DAILY ACTIVITIY SCORE: 24
SUGGESTED CMS G CODE MODIFIER MOBILITY: CH

## 2020-01-07 ASSESSMENT — PATIENT HEALTH QUESTIONNAIRE - PHQ9
SUM OF ALL RESPONSES TO PHQ9 QUESTIONS 1 AND 2: 0
1. LITTLE INTEREST OR PLEASURE IN DOING THINGS: NOT AT ALL
2. FEELING DOWN, DEPRESSED, IRRITABLE, OR HOPELESS: NOT AT ALL

## 2020-01-07 ASSESSMENT — LIFESTYLE VARIABLES
HAVE YOU EVER FELT YOU SHOULD CUT DOWN ON YOUR DRINKING: NO
AVERAGE NUMBER OF DAYS PER WEEK YOU HAVE A DRINK CONTAINING ALCOHOL: 0
TOTAL SCORE: 0
ALCOHOL_USE: NO
CONSUMPTION TOTAL: NEGATIVE
ON A TYPICAL DAY WHEN YOU DRINK ALCOHOL HOW MANY DRINKS DO YOU HAVE: 0
EVER_SMOKED: NEVER
HOW MANY TIMES IN THE PAST YEAR HAVE YOU HAD 5 OR MORE DRINKS IN A DAY: 0
DOES PATIENT WANT TO STOP DRINKING: NO
EVER FELT BAD OR GUILTY ABOUT YOUR DRINKING: NO
HAVE PEOPLE ANNOYED YOU BY CRITICIZING YOUR DRINKING: NO
EVER HAD A DRINK FIRST THING IN THE MORNING TO STEADY YOUR NERVES TO GET RID OF A HANGOVER: NO

## 2020-01-07 ASSESSMENT — COPD QUESTIONNAIRES
IN THE PAST 12 MONTHS DO YOU DO LESS THAN YOU USED TO BECAUSE OF YOUR BREATHING PROBLEMS: DISAGREE/UNSURE
COPD SCREENING SCORE: 0
DO YOU EVER COUGH UP ANY MUCUS OR PHLEGM?: NO/ONLY WITH OCCASIONAL COLDS OR INFECTIONS
DURING THE PAST 4 WEEKS HOW MUCH DID YOU FEEL SHORT OF BREATH: NONE/LITTLE OF THE TIME
HAVE YOU SMOKED AT LEAST 100 CIGARETTES IN YOUR ENTIRE LIFE: NO/DON'T KNOW

## 2020-01-07 ASSESSMENT — PAIN SCALES - GENERAL: PAIN_LEVEL: 1

## 2020-01-07 NOTE — PROGRESS NOTES
2 RN skin check complete:    Pt has dressing to nose, CDI. All other skin and bony prominences intact. No indication of skin breakdown.

## 2020-01-07 NOTE — OR SURGEON
Immediate Post OP Note    PreOp Diagnosis:   1. Nasal septal deviation  2. Nasal valve collapse  3. Inferior turbinate hypertrophy  4. Chronic tonsillitis    PostOp Diagnosis:   Same    Procedure(s):  SEPTOPLASTY, NOSE - Wound Class: Clean Contaminated  TURBINOPLASTY - Wound Class: Clean Contaminated  RHINOPLASTY- WITH GRAFT FUNCTIONAL - Wound Class: Clean Contaminated  TONSILLECTOMY AND ADENOIDECTOMY - Wound Class: Clean Contaminated    Surgeon(s):  Kacie Hollis M.D.    Anesthesiologist/Type of Anesthesia:  Anesthesiologist: Boubacar Valdivia M.D./General    Surgical Staff:  Circulator: Sheridan Givens R.N.  Relief Circulator: Anne Casarez R.N.  Scrub Person: Marlene Hernandez    Specimens removed if any:  ID Type Source Tests Collected by Time Destination   A : Right Tissue Tonsil PATHOLOGY SPECIMEN Kacie Hollis M.D. 1/7/2020  9:07 AM    B : Left Tissue Tonsil PATHOLOGY SPECIMEN Kacie Hollis M.D. 1/7/2020  9:07 AM        Estimated Blood Loss: 50cc    Findings: see op note    Complications: none        1/7/2020 11:00 AM Kacie Hollis M.D.

## 2020-01-07 NOTE — ANESTHESIA PREPROCEDURE EVALUATION
T&A hypertrophy  Deviated septum  Turbinate hypertrophy  Nasal obstruction    Relevant Problems   PULMONARY   (+) Moderate persistent asthma without complication      CARDIAC   (+) Benign heart murmur   (+) Migraine headache       Physical Exam    Airway   Mallampati: I  TM distance: >3 FB  Neck ROM: full       Cardiovascular - normal exam  Rhythm: regular  Rate: normal  (-) murmur     Dental - normal exam         Pulmonary - normal exam  Breath sounds clear to auscultation     Abdominal    Neurological - normal exam                 Anesthesia Plan    ASA 2       Plan - general       Airway plan will be ETT        Induction: intravenous    Postoperative Plan: Postoperative administration of opioids is intended.    Pertinent diagnostic labs and testing reviewed    Informed Consent:    Anesthetic plan and risks discussed with patient.

## 2020-01-07 NOTE — ANESTHESIA POSTPROCEDURE EVALUATION
Patient: Cortez Watters    Procedure Summary     Date:  01/07/20 Room / Location:  [facility] ROOM 22 / SURGERY SAME DAY [facility]    Anesthesia Start:  0822 Anesthesia Stop:  1101    Procedures:       SEPTOPLASTY, NOSE (Nose)      TURBINOPLASTY (Bilateral Nose)      RHINOPLASTY- WITH GRAFT FUNCTIONAL (Nose)      TONSILLECTOMY AND ADENOIDECTOMY (Throat) Diagnosis:  (CHRONIC TONSILITIS, HYPERTONSIL AND ADENOIDS)    Surgeon:  Kacie Hollis M.D. Responsible Provider:  Boubacar Valdivia M.D.    Anesthesia Type:  general ASA Status:  2          Final Anesthesia Type: general  Last vitals  BP   Blood Pressure: 149/83    Temp   36.3 °C (97.3 °F)    Pulse   Pulse: 67   Resp   19    SpO2   99 %      Anesthesia Post Evaluation    Patient location during evaluation: PACU  Patient participation: complete - patient participated  Level of consciousness: awake and alert  Pain score: 1    Airway patency: patent  Anesthetic complications: no  Cardiovascular status: hemodynamically stable  Respiratory status: acceptable  Hydration status: euvolemic    PONV: none           Nurse Pain Score: 0 (NPRS)

## 2020-01-07 NOTE — ANESTHESIA TIME REPORT
Anesthesia Start and Stop Event Times     Date Time Event    1/7/2020 0754 Ready for Procedure     0822 Anesthesia Start     1101 Anesthesia Stop        Responsible Staff  01/07/20    Name Role Begin End    Boubacar Valdivia M.D. Anesth 0822 1101        Preop Diagnosis (Free Text):  Pre-op Diagnosis     CHRONIC TONSILITIS, HYPERTONSIL AND ADENOIDS        Preop Diagnosis (Codes):    Post op Diagnosis  Chronic tonsillitis  nasal obstruction    Premium Reason  Non-Premium    Comments:

## 2020-01-07 NOTE — OR NURSING
1100-Pt in PACU from OR, report from OR RN and Dr. Gonzalez. Pt resting in gurney, VSS, lungs CTA, nasal packing in place. Pt denies pain/nausea.     1106-Handoff to Zuleima WARD.

## 2020-01-07 NOTE — OR NURSING
1118 Dr. Hollis at bedside, pulled out nasal packing.   1148 pt c/o 6/10 pain, medicated.   1243 Pt's mother at bedside.   1417 Called report to Pamela WARD. Pt to go to T4-1.  1447 Transporter at bedside, pt able to dangle at side of bed, transported via wheelchair, belongings with patient.

## 2020-01-08 VITALS
OXYGEN SATURATION: 95 % | DIASTOLIC BLOOD PRESSURE: 72 MMHG | TEMPERATURE: 98.9 F | SYSTOLIC BLOOD PRESSURE: 124 MMHG | RESPIRATION RATE: 17 BRPM | HEIGHT: 72 IN | HEART RATE: 68 BPM | BODY MASS INDEX: 32.25 KG/M2 | WEIGHT: 238.1 LBS

## 2020-01-08 PROCEDURE — G0378 HOSPITAL OBSERVATION PER HR: HCPCS

## 2020-01-08 PROCEDURE — 700102 HCHG RX REV CODE 250 W/ 637 OVERRIDE(OP): Performed by: OTOLARYNGOLOGY

## 2020-01-08 PROCEDURE — A9270 NON-COVERED ITEM OR SERVICE: HCPCS | Performed by: OTOLARYNGOLOGY

## 2020-01-08 PROCEDURE — 700111 HCHG RX REV CODE 636 W/ 250 OVERRIDE (IP): Performed by: OTOLARYNGOLOGY

## 2020-01-08 PROCEDURE — 96374 THER/PROPH/DIAG INJ IV PUSH: CPT

## 2020-01-08 RX ORDER — AMOXICILLIN 500 MG/1
500 CAPSULE ORAL EVERY 8 HOURS
Qty: 9 CAP | Refills: 0 | Status: SHIPPED | OUTPATIENT
Start: 2020-01-08 | End: 2020-01-11

## 2020-01-08 RX ADMIN — IBUPROFEN 600 MG: 600 TABLET ORAL at 05:16

## 2020-01-08 RX ADMIN — OMEPRAZOLE 20 MG: 20 CAPSULE, DELAYED RELEASE ORAL at 05:16

## 2020-01-08 RX ADMIN — HYDROCODONE BITARTRATE AND ACETAMINOPHEN 15 ML: 7.5; 325 SOLUTION ORAL at 05:16

## 2020-01-08 RX ADMIN — AMOXICILLIN 500 MG: 500 CAPSULE ORAL at 05:16

## 2020-01-08 RX ADMIN — DEXAMETHASONE SODIUM PHOSPHATE 10 MG: 4 INJECTION, SOLUTION INTRA-ARTICULAR; INTRALESIONAL; INTRAMUSCULAR; INTRAVENOUS; SOFT TISSUE at 05:16

## 2020-01-08 NOTE — OP REPORT
DATE OF SERVICE:  01/07/2020    PREOPERATIVE DIAGNOSES:  1. Nasal valve collapse.  2. Nasal septal deviation.  3. Inferior turbinate hypertrophy.  4. Acquired nasal deformity.  5. Chronic tonsillitis.  6. Adenotonsillar hypertrophy.    POSTOPERATIVE DIAGNOSES:  1. Nasal valve collapse.  2. Nasal septal deviation.  3. Inferior turbinate hypertrophy.  4. Acquired nasal deformity.  5. Chronic tonsillitis.  6. Adenotonsillar hypertrophy.    OPERATION PERFORMED:  1. Functional rhinoplasty with harvest of cartilage graft and placement of   bilateral  grafts, columellar strut, tip suturing, flaring stitch.  2. Septoplasty.  3. Inferior turbinate submucous resection and outfracture.  4. Tonsillectomy.    ANESTHESIA:  General.    ANESTHESIOLOGIST:  Boubacar Valdivia MD    ESTIMATED BLOOD LOSS:  50 mL    COMPLICATIONS:  None.    FINDINGS:  1. Severe septal deviation with broad deviation to the right and large spur to   the left.  2. Severe inferior turbinate hypertrophy  3. Nasal valve collapse, right greater than   left.    SPECIMENS:  Tonsils to pathology.    INDICATION FOR PROCEDURE:  The patient has a longstanding history of nasal   congestion despite aggressive medical therapy.  He also struggles with chronic   sore throat, tonsil stones and difficulty with snoring.  As such, it was   recommended he undergo the above stated procedures.  These were explained in   detail.  Risks were discussed including but not limited to pain, bleeding,   infection, scar formation, nasal septal perforation, change in sense of smell,   VBI, damage to lips, teeth, tongue, or gums, recurrence of symptoms,   unacceptable cosmetic appearance and need for further procedure.  Informed   surgical consent was obtained.    DESCRIPTION OF OPERATION:  The patient was met in the preoperative holding   area and again the consent and history were reviewed.  He was brought back to   the operating room in good condition.  After appropriate  anesthetic markers   were placed, a surgical time-out was taken and general endotracheal anesthesia   was induced.  The bed was then turned to 180 degrees.  The nasal soft tissue   envelope was first infiltrated with 1% lidocaine with epinephrine and   epinephrine-soaked cottonoids were placed into the nose for decongestion.  The   patient was then prepped and draped with Betadine.  An inverted V   transcolumellar incision was made along with bilateral marginal incisions.    The underlying cartilage was identified and in a subperichondrial plane, the   soft tissue was elevated off of the medial crural footplates.  The marginal   incisions were extended as dissection continued.  Dissection continued over   the medial crural footplates, the domes and the lateral crura bilaterally.  A   medial tunnel was then made in the soft tissue overlying the nasal dorsum.    The intervening soft tissue was taken down using iris scissors and Bovie   electrocautery.  The remainder of the nasal soft tissue envelope was elevated   bluntly using a Ray-Joanna and hemostasis was achieved.  Bilateral   mucoperichondrial flaps were then raised from the open approach and the upper   lateral cartilages were disarticulated as the septal flaps were elevated.  The   upper lateral cartilages were noted to be somewhat collapsed and not in their   normal anatomic position.  There was a small rent overlying the spur on the   left hand side.  The midportion of the quadrangular cartilage was harvested   and saved on the back table for grafting.  The deviated portions of the bone   were then addressed, creating a straight septum.  Two  grafts were fashioned   from the harvested cartilage and placed in between the septum and the upper   lateral cartilages bilaterally.  These were sutured in place and the upper   lateral cartilages were rearticulated in their normal anatomic position.  A   columellar strut was then fashioned and placed in between  the medial crural   footplates and sutured in place.  Nasal tip anatomy was then reapproximated   using a 6-0 Prolene and a flaring suture.  A flaring stitch was placed given   the significant valve collapse and concave shape of the lateral crura of the   lower lateral cartilage particularly on the right.  The nasal soft tissue   envelope was then redraped.  The transcolumellar incision was closed as were   the marginal incisions and a standard quilting stitch was placed to coapt the   mucosal flaps. The inferior turbinates were then addressed.  Small stab incisions   were made in the head of the turbinates bilaterally and a Aurelia was used to   elevate the soft tissue.  A microdebrider was used to ablate the soft tissue   and the bone, and the turbinates were outfractured.  Posterior mulberry tips   were cauterized using suction Bovie electrocautery.  Hemostasis was achieved   using epinephrine-soaked cottonoids and noted to be adequate. Steri-Strips were then applied across the nose.  All bleeding   was controlled using epinephrine-soaked cottonoids and Bovie electrocautery as   indicated.    I then turned my attention towards the tonsils.  A McIvor mouth   gag was inserted into the mouth, opened and suspended from the Ramon stand.    The palate was palpated and noted to be intact.  The right tonsil was grasped   using an Allis and retracted medially.  The anterior tonsillar pillar was   incised and the tonsil was completely removed from the underlying fossa using   Bovie electrocautery.  Suction Bovie electrocautery was used to achieve   hemostasis.  The same procedure was completed on the contralateral side.  The   mouth and nose were then copiously irrigated using normal saline.  An   orogastric tube was passed to suction out the stomach contents.  The mouth gag   was released for a period of 1 minute, reopened and again hemostasis was   ensured.    The procedure   was then terminated.  Care of the patient was  turned back over to anesthesia   for emergence extubation.  He was taken to PACU in stable condition.  There   were no apparent complications.  All instruments counts were correct x2 at the   completion of the case.             ____________________________________     MD KANG Florez / CODY    DD:  01/07/2020 17:12:19  DT:  01/07/2020 17:34:53    D#:  [id number]  Job#:  [id number]

## 2020-01-08 NOTE — PROGRESS NOTES
S: NAEON. On RA. Pain controlled.    O:  /62   Pulse 60   Temp 36.6 °C (97.9 °F) (Temporal)   Resp 20   Ht 1.829 m (6')   Wt 108 kg (238 lb 1.6 oz)   SpO2 96%   Gen: interactive and appropriate  Pulm: Breathing comfortably on RA without stridor or stertor  Face: symmetric, no edema, facial strength intact bilaterally  Eyes: conjunctiva clear, no chemosis. Vision grossly intact bilaterally  Ears: pinna normal. Hearing grossly intact  Nose: dressing in place, appropriate crusting  OC/OP: clear, no masses. Dentition intact. Posterior pharynx easily visible. Floor of mouth soft and flat  Neck: flat, no discrete masses  Neuro: cranial nerves grossly intact bilaterally. Mood appropriate  Ext: no edema                    A/P:Cortez Watters is a 39 y.o. male with ABDELRAHMAN, chronic tonsillitis, nasal obstruction. Doing well post-op. No desats. Pain controlled. Will d/c home today. Already has pain med rx. D/c with prednisone and amox. Follow up in 1 week    Kacie Hollis M.D.

## 2020-01-08 NOTE — PROGRESS NOTES
Pt is A&O x's 4, pt denies any pain. Tolerating diet, denies N/V, nasal incision steri strips in place, scant amount of old drainage, +void, + flatus, last BM (PTA), up self, steady gait. Bed in lowest position and locked, bed alarm not indicated. Reviewed plan of care with patient and MD, all questions and concerns have been addressed. Discharge orders placed, prescriptions given, all questions and concerns have been addresses, will continue to advance to discharge.

## 2020-01-08 NOTE — CARE PLAN
Problem: Safety  Goal: Will remain free from falls  Description  Pt remains free from fall at this time.  Pt educated on fall risk. Pt demonstrates understanding by appropriate use of call light to call for assistance.     Outcome: PROGRESSING AS EXPECTED     Problem: Venous Thromboembolism (VTW)/Deep Vein Thrombosis (DVT) Prevention:  Goal: Patient will participate in Venous Thrombosis (VTE)/Deep Vein Thrombosis (DVT)Prevention Measures  Description  Pt remains free from DVT.  Pt educated on risk for DVT and prevention of.  Pt verbalized understanding    Outcome: PROGRESSING AS EXPECTED

## 2020-01-08 NOTE — DISCHARGE INSTRUCTIONS
Discharge Instructions    Discharged to home by car with relative. Discharged via walking, hospital escort: Yes.  Special equipment needed: Not Applicable    Be sure to schedule a follow-up appointment with your primary care doctor or any specialists as instructed.     Discharge Plan:   Diet Plan: Discussed  Activity Level: Discussed  Confirmed Follow up Appointment: Patient to Call and Schedule Appointment  Confirmed Symptoms Management: Discussed  Medication Reconciliation Updated: Yes  Influenza Vaccine Indication: Not indicated: Previously immunized this influenza season and > 8 years of age    I understand that a diet low in cholesterol, fat, and sodium is recommended for good health. Unless I have been given specific instructions below for another diet, I accept this instruction as my diet prescription.   Other diet: Regular as tolerated     Special Instructions: None    · Is patient discharged on Warfarin / Coumadin?   No     Depression / Suicide Risk    As you are discharged from this [facility] facility, it is important to learn how to keep safe from harming yourself.    Recognize the warning signs:  · Abrupt changes in personality, positive or negative- including increase in energy   · Giving away possessions  · Change in eating patterns- significant weight changes-  positive or negative  · Change in sleeping patterns- unable to sleep or sleeping all the time   · Unwillingness or inability to communicate  · Depression  · Unusual sadness, discouragement and loneliness  · Talk of wanting to die  · Neglect of personal appearance   · Rebelliousness- reckless behavior  · Withdrawal from people/activities they love  · Confusion- inability to concentrate     If you or a loved one observes any of these behaviors or has concerns about self-harm, here's what you can do:  · Talk about it- your feelings and reasons for harming yourself  · Remove any means that you might use to hurt yourself (examples: pills, rope,  extension cords, firearm)  · Get professional help from the community (Mental Health, Substance Abuse, psychological counseling)  · Do not be alone:Call your Safe Contact- someone whom you trust who will be there for you.  · Call your local CRISIS HOTLINE 921-7409 or 560-355-1685  · Call your local Children's Mobile Crisis Response Team [place] (648) 605-1304 or www.Zoom  · Call the toll free National Suicide Prevention Hotlines   · National Suicide Prevention Lifeline 040-971-WMCB (7838)  · [employer] Hope Line Network 800-SUICIDE (449-4961)        Follow up with Dr. Hollis in 1 week as previously instructed   Use nasal saline spray in nose at least 3-4 times per day   Keep nasal dressing dry   Use ice on nose to help with swelling for 2-3 days    Rhinoplasty, Care After  Refer to this sheet in the next few weeks. These instructions provide you with information on caring for yourself after your procedure. Your health care provider may also give you more specific instructions. Your treatment has been planned according to current medical practices, but problems sometimes occur. Call your health care provider if you have any problems or questions after your procedure.   WHAT TO EXPECT AFTER THE PROCEDURE  After your procedure, it is typical to have the following:  · Swelling of the nose and face. Swelling and bruising around the eyes may increase for the first 2-3 days. It usually goes away in a couple weeks.  · Headaches and facial pain. You will be given pain medicine to control this.  · Minor bleeding from the nose the first couple days after surgery.  · Nasal congestion. Your health care provider may recommend an over-the-counter medicine (decongestant) for this.  · Nasal discharge for 1-2 days after surgery.  HOME CARE INSTRUCTIONS  · Do not blow or pick at your nose until healing is complete.  · Only remove bandages (dressings) and nasal packing as directed by your health care provider.  · The  nasal splint will be removed 5-7 days after the surgery.  · Keep your head raised (elevated) on a couple pillows. Sleep with your head elevated.  · Apply ice to your face to help keep swelling down.  ¨ Put ice in a plastic bag.  ¨ Place a towel between your skin and the bag.  ¨ Leave the ice on for 20 minutes, 2-3 times a day.  · Avoid strenuous activities for 2-3 weeks. Protect your nose until healing is complete.  · Keep your nose dry when showering as directed by your health care provider.  · Ask your health care provider when you may return to work. You will likely be able to return to work within a week. Full recovery may take several weeks.  · If you wear glasses, tape them to your forehead until your nose is completely healed.  · Avoid unprotected sun exposure for 2 months.  · Only take over-the-counter or prescription medicines as directed by your health care provider.  · Be patient with the healing process. Day by day your nose will look better. In a couple weeks, cosmetics can be used to cover up the signs of the surgery. Healing takes time, and the final results may not be known for up to a year.  SEEK MEDICAL CARE IF:  · You have a fever.  · You lose your nasal packing before the time your health care provider says it is to be removed.  · You have a pus-like discharge from your nose.  SEEK IMMEDIATE MEDICAL CARE IF:  · You have increased pain or swelling in your face or around your eyes after the first 2-3 days.    · You have increased bleeding from your nose.    · Your headache or facial pain is not relieved with pain medicine.    · You have chest pain or shortness of breath.     This information is not intended to replace advice given to you by your health care provider. Make sure you discuss any questions you have with your health care provider.     Document Released: 10/08/2014 Document Reviewed: 10/08/2014  [employer] Interactive Patient Education ©2016 [employer] Inc.      Tonsillectomy, Adult, Care  After  These instructions give you information on caring for yourself after your procedure. Your doctor may also give you more specific instructions. Call your doctor if you have any problems or questions after your procedure.  HOME CARE  · Get proper rest and keep your head raised at all times.  · Drink lots of fluids.  · Take medicines only as told by your doctor.  · Eat soft and cold foods, such as ice cream, frozen ice pops, and cold drinks.  · Avoid mouthwashes and gargles.  · Avoid people with colds and sore throats.  GET HELP IF:  · Your pain does not go away after you take pain medicine.  · You have a rash.  · You have a fever.  · You feel light-headed or pass out (faint).  · You cannot swallow even a little liquid or spit.  · Your pee is getting very dark.  GET HELP RIGHT AWAY IF:  · You have trouble breathing.  · You have any allergy problems because of your medicines.  · You have increased bleeding, you throw up (vomit), or you cough or spit up bright red blood.     This information is not intended to replace advice given to you by your health care provider. Make sure you discuss any questions you have with your health care provider.     Document Released: 01/20/2012 Document Revised: 05/03/2016 Document Reviewed: 07/15/2014  [employer] Interactive Patient Education ©2016 [employer]    Septoplasty, Care After  Refer to this sheet in the next few weeks. These instructions provide you with information about caring for yourself after your procedure. Your health care provider may also give you more specific instructions. Your treatment has been planned according to current medical practices, but problems sometimes occur. Call your health care provider if you have any problems or questions after your procedure.  WHAT TO EXPECT AFTER THE PROCEDURE  After your procedure, it is typical to have the following:  · Mild headache.  · Stuffy nose.  · Feeling of fullness in your ears.  · Bloody fluid coming from your  nose.  HOME CARE INSTRUCTIONS  Medicines  · If you were prescribed an antibiotic medicine, finish it all even if you start to feel better.  · Take medicines only as directed by your health care provider.  · You may be directed to clean your nostrils with a saline nasal spray. This will help to clear the crusts and blood clots in your nose. The solution can be found as an over-the-counter solution or made at home as directed by your health care provider.  · You may need to take laxatives or stool softeners as directed by your health care provider.  What to Avoid  · Do not blow your nose for 2 weeks after surgery or as directed by your health care provider.  · Do not lift anything heavier than 10 lb (4.5 kg) for 2 weeks or as directed by your health care provider.  · Avoid strenuous activities that can cause nosebleeds for 2 weeks. These include activities such as running or playing sports.  · Avoid very hot or steamy showers for several days after surgery or as directed by your health care provider.  Eating and Drinking  · Avoid eating hot and spicy foods for several days after surgery or as directed by your health care provider.  · Eat plenty of fiber to keep your stools soft, especially while you are taking pain medicine. This helps you to avoid straining, which can cause a nosebleed.  · Drink enough fluid to keep your urine clear or pale yellow.  General Instructions  · Raise (elevate) your head while you are lying down.  · Keep all follow-up visits as directed by your health care provider. This is important.  · If you have nasal splints, follow your health care provider's instructions about removal.  · If your nose was packed with gauze, follow your health care provider's instructions about removal.  SEEK MEDICAL CARE IF:  · You develop swelling or increased pain in your nose.  · You have yellowish-white fluid (pus) coming from your nose.  · You have severe diarrhea.  · You have ongoing (persistent)  nausea.  · You cannot breathe through your nose.  · You have a fever.  SEEK IMMEDIATE MEDICAL CARE IF:  · You are short of breath.  · You feel dizzy or you faint.  · You have vision changes.  · You are bleeding heavily from your nose.  · You are vomiting.  · You have a severe headache or a stiff neck.     This information is not intended to replace advice given to you by your health care provider. Make sure you discuss any questions you have with your health care provider.     Document Released: 12/18/2006 Document Revised: 01/08/2016 Document Reviewed: 07/29/2015  [employer] Interactive Patient Education ©2016 Else[employer] Inc.      Adenoidectomy, Adult, Care After  Refer to this sheet in the next few weeks. These instructions provide you with information on caring for yourself after your procedure. Your health care provider may also give you more specific instructions. Your treatment has been planned according to current medical practices, but problems sometimes occur. Call your health care provider if you have any problems or questions after your procedure.  WHAT TO EXPECT AFTER THE PROCEDURE  · Your ears may feel plugged.  · Your hearing may be decreased.  · Your nose may feel congested.  · You may bleed when you blow your nose.  HOME CARE INSTRUCTIONS  · Get plenty of rest, keeping your head elevated at all times.  · Drink enough fluids to keep your urine clear or pale yellow.  · Only take over-the-counter or prescription medicines for pain, discomfort, or fever as directed by your health care provider. Do not take aspirin or nonsteroidal anti-inflammatory drugs. These medicines increase the possibility of bleeding.  · Soft and cold foods are usually the easiest to eat. These include gelatin, sherbet, ice cream, frozen ice pops, and cold drinks. Several days after surgery, you will be able to eat more solid food.  · Avoid mouthwash and gargling.  · Avoid contact with people who have upper respiratory infections like  colds and sore throats.  · Apply an ice pack to your neck. This may help with discomfort and keep swelling down.  ¨ Put ice in a plastic bag.  ¨ Place a towel between your skin and the bag.  ¨ Leave the ice on for 20 minutes, 2-3 times a day.  SEEK MEDICAL CARE IF:  · You have increasing pain that is not controlled with medicines.  · You have a fever.  · You feel lightheaded or have a fainting spell.  · You develop a rash.  SEEK IMMEDIATE MEDICAL CARE IF:  · You have chest pain or difficulty breathing.  · You experience allergic reactions to medicines.  · You bleed bright red blood from your throat, or you vomit bright red blood.     This information is not intended to replace advice given to you by your health care provider. Make sure you discuss any questions you have with your health care provider.     Document Released: 07/06/2006 Document Revised: 01/08/2016 Document Reviewed: 07/30/2014  [employer] Interactive Patient Education ©2016 [employer]vier Inc.

## 2020-01-11 ENCOUNTER — PATIENT MESSAGE (OUTPATIENT)
Dept: MEDICAL GROUP | Facility: MEDICAL CENTER | Age: 40
End: 2020-01-11

## 2020-01-11 DIAGNOSIS — K21.9 GERD (GASTROESOPHAGEAL REFLUX DISEASE): ICD-10-CM

## 2020-01-13 RX ORDER — LORATADINE 10 MG/1
10 TABLET ORAL DAILY
Qty: 30 TAB | Refills: 2 | Status: SHIPPED | OUTPATIENT
Start: 2020-01-13 | End: 2020-04-17

## 2020-01-13 RX ORDER — ATORVASTATIN CALCIUM 10 MG/1
10 TABLET, FILM COATED ORAL DAILY
Qty: 30 TAB | Refills: 2 | Status: SHIPPED | OUTPATIENT
Start: 2020-01-13 | End: 2020-04-17

## 2020-01-13 RX ORDER — OMEPRAZOLE 20 MG/1
20 CAPSULE, DELAYED RELEASE ORAL DAILY
Qty: 30 CAP | Refills: 2 | Status: SHIPPED | OUTPATIENT
Start: 2020-01-13 | End: 2020-04-17

## 2020-01-13 NOTE — TELEPHONE ENCOUNTER
From: Cortez Watters  To: Renata Hernandez M.D.  Sent: 1/11/2020 12:13 PM PST  Subject: Prescription Question    Good Morning,    [facility] but [facility] I was hoping to get them filled at the [facility] [address] because of my zero fee  insurance. The medications that I am almost out of are: Atorvastatin Calcium 10 mg TAB; Omeprazole 20 mg Cap; and Loratadine 10 mg TAB. Please let me know if you have any questions, thank you!    Cortez Watters

## 2020-03-02 ENCOUNTER — TELEPHONE (OUTPATIENT)
Dept: MEDICAL GROUP | Facility: MEDICAL CENTER | Age: 40
End: 2020-03-02

## 2020-03-02 DIAGNOSIS — H04.129 DRY EYE: ICD-10-CM

## 2020-04-15 DIAGNOSIS — K21.9 GERD (GASTROESOPHAGEAL REFLUX DISEASE): ICD-10-CM

## 2020-04-15 NOTE — TELEPHONE ENCOUNTER
Received request via: Pharmacy    Was the patient seen in the last year in this department? Yes    Does the patient have an active prescription (recently filled or refills available) for medication(s) requested? No   Other/Transfer to [place] for stroke rescue

## 2020-04-17 RX ORDER — ATORVASTATIN CALCIUM 10 MG/1
TABLET, FILM COATED ORAL
Qty: 30 TAB | Refills: 0 | Status: SHIPPED | OUTPATIENT
Start: 2020-04-17 | End: 2020-05-21

## 2020-04-17 RX ORDER — LORATADINE 10 MG/1
TABLET ORAL
Qty: 30 TAB | Refills: 0 | Status: SHIPPED | OUTPATIENT
Start: 2020-04-17 | End: 2020-05-21

## 2020-04-17 RX ORDER — OMEPRAZOLE 20 MG/1
CAPSULE, DELAYED RELEASE ORAL
Qty: 30 CAP | Refills: 0 | Status: SHIPPED | OUTPATIENT
Start: 2020-04-17 | End: 2020-05-28

## 2020-05-28 DIAGNOSIS — K21.9 GERD (GASTROESOPHAGEAL REFLUX DISEASE): ICD-10-CM

## 2020-05-28 RX ORDER — OMEPRAZOLE 20 MG/1
CAPSULE, DELAYED RELEASE ORAL
Qty: 30 CAP | Refills: 0 | Status: SHIPPED | OUTPATIENT
Start: 2020-05-28 | End: 2020-10-16

## 2020-07-06 ENCOUNTER — OFFICE VISIT (OUTPATIENT)
Dept: MEDICAL GROUP | Facility: MEDICAL CENTER | Age: 40
End: 2020-07-06
Payer: OTHER GOVERNMENT

## 2020-07-06 VITALS
HEART RATE: 58 BPM | OXYGEN SATURATION: 99 % | BODY MASS INDEX: 32.2 KG/M2 | SYSTOLIC BLOOD PRESSURE: 128 MMHG | HEIGHT: 73 IN | TEMPERATURE: 97.9 F | WEIGHT: 243 LBS | DIASTOLIC BLOOD PRESSURE: 80 MMHG

## 2020-07-06 DIAGNOSIS — R74.01 ELEVATED ALT MEASUREMENT: ICD-10-CM

## 2020-07-06 DIAGNOSIS — G47.33 OSA (OBSTRUCTIVE SLEEP APNEA): ICD-10-CM

## 2020-07-06 DIAGNOSIS — J45.40 MODERATE PERSISTENT ASTHMA WITHOUT COMPLICATION: ICD-10-CM

## 2020-07-06 DIAGNOSIS — Z23 NEED FOR VACCINATION: ICD-10-CM

## 2020-07-06 DIAGNOSIS — R00.1 BRADYCARDIA: ICD-10-CM

## 2020-07-06 DIAGNOSIS — R03.0 ELEVATED BLOOD PRESSURE READING: ICD-10-CM

## 2020-07-06 DIAGNOSIS — T78.40XD ALLERGIC REACTION, SUBSEQUENT ENCOUNTER: ICD-10-CM

## 2020-07-06 PROBLEM — J02.9 SORE THROAT: Status: RESOLVED | Noted: 2019-05-24 | Resolved: 2020-07-06

## 2020-07-06 PROBLEM — T78.40XA ALLERGIC REACTION: Status: ACTIVE | Noted: 2020-07-06

## 2020-07-06 PROCEDURE — 90471 IMMUNIZATION ADMIN: CPT | Performed by: FAMILY MEDICINE

## 2020-07-06 PROCEDURE — 99214 OFFICE O/P EST MOD 30 MIN: CPT | Mod: 25 | Performed by: FAMILY MEDICINE

## 2020-07-06 PROCEDURE — 90732 PPSV23 VACC 2 YRS+ SUBQ/IM: CPT | Performed by: FAMILY MEDICINE

## 2020-07-06 RX ORDER — OMEPRAZOLE 20 MG/1
CAPSULE, DELAYED RELEASE ORAL
COMMUNITY
Start: 2020-04-17 | End: 2020-10-16

## 2020-07-06 RX ORDER — FEXOFENADINE HCL 180 MG/1
TABLET ORAL
COMMUNITY
Start: 2020-06-15

## 2020-07-06 RX ORDER — FLUTICASONE PROPIONATE 50 MCG
SPRAY, SUSPENSION (ML) NASAL
COMMUNITY
Start: 2011-04-01 | End: 2020-10-16

## 2020-07-06 RX ORDER — EPINEPHRINE 0.3 MG/.3ML
INJECTION SUBCUTANEOUS
COMMUNITY
Start: 2020-05-05

## 2020-07-06 RX ORDER — BUDESONIDE AND FORMOTEROL FUMARATE DIHYDRATE 160; 4.5 UG/1; UG/1
AEROSOL RESPIRATORY (INHALATION)
COMMUNITY
Start: 2020-05-20

## 2020-07-06 ASSESSMENT — PATIENT HEALTH QUESTIONNAIRE - PHQ9: CLINICAL INTERPRETATION OF PHQ2 SCORE: 0

## 2020-07-07 NOTE — PROGRESS NOTES
Subjective:     CC: Diagnoses of ABDELRAHMAN (obstructive sleep apnea), Moderate persistent asthma without complication, Allergic reaction, subsequent encounter, Bradycardia, Elevated blood pressure reading, Elevated ALT measurement, and Need for vaccination were pertinent to this visit.    HPI: Patient is a 39 y.o. male established patient who presents today for yearly follow-up.      ABDELRAHMAN (obstructive sleep apnea)  Chronic problem. Using a cpap now. Does seem to be helping.  Still gets a little fatigued.    Moderate persistent asthma without complication  Chronic problem. Well controlled. Denies any SOB. Currently on symbicort 2 puffs twice daily.  Managed by his allergist.    Allergic reaction  New problem.  The patient has a long history of allergies.  He was getting immunotherapy shots through his allergist.  But 1-1/2 months ago he had an anaphylactic reaction following 1 of his immunotherapy shots.  He had shortness of breath, change in coloring, swelling.  In the allergy clinic he was given epinephrine, H2 blocker, steroid and breathing treatments.  He improved and is now doing smaller doses of the allergy shots again.  His allergist was concerned as his heart rate was stable around 60 even after given epinephrine.     Rated cardia  New problem.  As above, the patient states his allergist told him that his heart rate did not increase after given epinephrine following his anaphylactic reaction.  The patient states that he exercises regularly.  He has a [place] watch that monitors his heart rate and generally his heart rate gets between 138 to 158 bpm during exercise.  He denies any palpitations.  The patient, he was monitored throughout his entire anaphylactic reaction with a pulse oximeter.  He does have a family history of heart disease, his brother and his mother both see a cardiologist in [place], Dr. Negro  He denies any chest pain or palpitations.    Past Medical History:   Diagnosis Date   • Asthma     daily  and prn inhaler    • Benign heart murmur congenital   • Exercise-induced bronchospasm    • Heart burn    • Hyperlipidemia    • Migraine    • Sleep apnea     no CPAP    • Snoring        Social History     Tobacco Use   • Smoking status: Never Smoker   • Smokeless tobacco: Never Used   Substance Use Topics   • Alcohol use: Not Currently   • Drug use: No       Current Outpatient Medications Ordered in Epic   Medication Sig Dispense Refill   • SYMBICORT 160-4.5 MCG/ACT Aerosol      • fexofenadine (ALLEGRA) 180 MG tablet      • EPINEPHrine (EPIPEN) 0.3 MG/0.3ML Solution Auto-injector solution for injection INJECT INTRAMUSCULARLY AS DIRECTED PRF ANAPHYLAXIS     • Carboxymethylcellulose Sodium 0.25 % Solution      • omeprazole (PRILOSEC) 20 MG delayed-release capsule TAKE ONE CAPSULE BY MOUTH ONE TIME DAILY  30 Cap 0   • loratadine (CLARITIN) 10 MG Tab TAKE ONE TABLET BY MOUTH ONE TIME DAILY  30 Tab 1   • atorvastatin (LIPITOR) 10 MG Tab TAKE ONE TABLET BY MOUTH ONE TIME DAILY  30 Tab 1   • fluticasone (FLONASE) 50 MCG/ACT nasal spray Spray 2 Sprays in nose every day.     • Probiotic Product (PROBIOTIC DAILY PO) Take  by mouth every day.     • Ibuprofen (ADVIL PO) Take  by mouth as needed.     • therapeutic multivitamin-minerals (THERAGRAN-M) Tab Take 1 Tab by mouth every day.     • Lifitegrast (XIIDRA) 5 % Solution by Ophthalmic route.     • Bepotastine Besilate (BEPREVE) 1.5 % Solution by Ophthalmic route.     • ketoconazole (NIZORAL) 2 % Cream Apply small amount on affected areas BID x 2 weeks or until rash resolves. 1 Tube 1   • triamcinolone (ARISTOCORT) 0.5 % ointment Apply small amount to affected areas BID for up to 10 days as needed for rash 1 Tube 1   • sumatriptan (IMITREX) 100 MG tablet Take 1 Tab by mouth Once PRN for Migraine for 1 dose. 10 Tab 3   • albuterol (VENTOLIN HFA) 108 (90 BASE) MCG/ACT AERS Inhale 2 Puffs by mouth every four hours as needed for Shortness of Breath. 1 Inhaler 3   • fluticasone  "(FLONASE) 50 MCG/ACT nasal spray      • omeprazole (PRILOSEC) 20 MG delayed-release capsule        No current Epic-ordered facility-administered medications on file.        Allergies:  Patient has no known allergies.    Health Maintenance: Completed    ROS:  Pulm: no sob, no cough  CV: no chest pain, no palpitations        Objective:       Exam:  /80 (BP Location: Left arm, Patient Position: Sitting, BP Cuff Size: Adult long)   Pulse (!) 58   Temp 36.6 °C (97.9 °F) (Temporal)   Ht 1.854 m (6' 1\")   Wt 110.2 kg (243 lb)   SpO2 99%   BMI 32.06 kg/m²  Body mass index is 32.06 kg/m².    General: Normal appearing. No distress.  HEAD: NCAT  EYES: conjunctiva clear, lids without ptosis, pupils equal and reactive to light  EARS: ears normal shape and contour.  MOUTH: normal dentition   Neck:  Normal ROM  Pulmonary: Normal effort. Normal respiratory rate.  Cardiovascular: Well perfused. No LE edema  Neurologic: Grossly normal, no focal deficits  Skin: Warm and dry.  No obvious lesions.  Musculoskeletal: Normal gait and station.   Psych: Normal mood and affect. Alert and oriented x3. Judgment and insight is normal.     Assessment & Plan:     39 y.o. male with the following -     1. ABDELRAHMAN (obstructive sleep apnea)  New problem, being managed through pulmonology, has CPAP, compliant.  Notes improvement.  - CBC WITH DIFFERENTIAL; Future    2. Moderate persistent asthma without complication  Chronic problem, generally well controlled with Symbicort, 2 puffs daily.  Followed by allergy.  - CBC WITH DIFFERENTIAL; Future    3. Allergic reaction, subsequent encounter  New problem.  Following immunotherapy shot.  Improved with epinephrine, H2 blocker, steroid and breathing treatments.    4. Bradycardia  New problem.  Per the patient has allergist noted heart rate of 60 throughout his anaphylactic reaction even after given epinephrine.  EKG done today in office, normal sinus rhythm.  No sign of heart block.  He has normal " increased heart rate during exercise according to his, and watch.  However, given his family history of heart disease he request to see a cardiologist.  Referral placed.  - REFERRAL TO CARDIOLOGY General Cardiology MD    5. Elevated blood pressure reading  - Lipid Profile; Future    6. Elevated ALT measurement  - Comp Metabolic Panel; Future    7. Need for vaccination  - Pneumovax Vaccine (PPSV23)      Return in about 6 months (around 1/6/2021).  May follow-up sooner if labs are abnormal.    Please note that this dictation was created using voice recognition software. I have made every reasonable attempt to correct obvious errors, but I expect that there are errors of grammar and possibly content that I did not discover before finalizing the note.

## 2020-07-17 RX ORDER — LORATADINE 10 MG/1
TABLET ORAL
Qty: 30 TAB | Refills: 0 | Status: SHIPPED | OUTPATIENT
Start: 2020-07-17 | End: 2020-08-14

## 2020-07-17 RX ORDER — ATORVASTATIN CALCIUM 10 MG/1
TABLET, FILM COATED ORAL
Qty: 30 TAB | Refills: 0 | Status: SHIPPED | OUTPATIENT
Start: 2020-07-17 | End: 2020-08-14

## 2020-07-17 NOTE — TELEPHONE ENCOUNTER
Received request via: Pharmacy    Was the patient seen in the last year in this department? Yes    Does the patient have an active prescription (recently filled or refills available) for medication(s) requested? No     Future Appointments       Provider Department Center    1/14/2021 11:00 AM Renata Hernandez M.D. [facility]

## 2020-09-11 ENCOUNTER — TELEPHONE (OUTPATIENT)
Dept: MEDICAL GROUP | Facility: MEDICAL CENTER | Age: 40
End: 2020-09-11

## 2020-09-11 DIAGNOSIS — H04.129 DRY EYE: ICD-10-CM

## 2020-09-11 NOTE — TELEPHONE ENCOUNTER
----- Message from Kassi Tello sent at 9/11/2020  8:06 AM PDT -----  Regarding: FW: Dr. Sexton  Contact: [phone]    ----- Message -----  From: Cortez Watters  Sent: 9/8/2020   4:03 PM PDT  To: Amb All Mas  Subject: Dr. Sexton                                      Topic: Referral Status    Good Afternoon,    I am currently showing that my Optometrist authorization date range is from 3/3/2020 to 8/30/2020 for Dr. Mehran Sexton. Can I please get a new authorization because I have a new appointment on Monday, September 14, 2020? If you need any other information, please let me know, thanks.    Thank you,  Cortez Watters

## 2020-09-11 NOTE — TELEPHONE ENCOUNTER
2. SPECIFIC Action To Be Taken: Orders pending, please sign.    3. Diagnosis/Clinical Reason for Request: Dry eye    4. Specialty & Provider Name/Lab/Imaging Location: Dr. Mehran Sexton    5. Is appointment scheduled for requested order/referral: yes - 9/14/20

## 2020-10-12 ENCOUNTER — HOSPITAL ENCOUNTER (OUTPATIENT)
Dept: LAB | Facility: MEDICAL CENTER | Age: 40
End: 2020-10-12
Attending: FAMILY MEDICINE
Payer: OTHER GOVERNMENT

## 2020-10-12 DIAGNOSIS — R74.01 ELEVATED ALT MEASUREMENT: ICD-10-CM

## 2020-10-12 DIAGNOSIS — R03.0 ELEVATED BLOOD PRESSURE READING: ICD-10-CM

## 2020-10-12 DIAGNOSIS — G47.33 OSA (OBSTRUCTIVE SLEEP APNEA): ICD-10-CM

## 2020-10-12 DIAGNOSIS — J45.40 MODERATE PERSISTENT ASTHMA WITHOUT COMPLICATION: ICD-10-CM

## 2020-10-12 LAB
ALBUMIN SERPL BCP-MCNC: 4.8 G/DL (ref 3.2–4.9)
ALBUMIN/GLOB SERPL: 1.3 G/DL
ALP SERPL-CCNC: 106 U/L (ref 30–99)
ALT SERPL-CCNC: 42 U/L (ref 2–50)
ANION GAP SERPL CALC-SCNC: 14 MMOL/L (ref 7–16)
AST SERPL-CCNC: 32 U/L (ref 12–45)
BASOPHILS # BLD AUTO: 0.5 % (ref 0–1.8)
BASOPHILS # BLD: 0.04 K/UL (ref 0–0.12)
BILIRUB SERPL-MCNC: 0.7 MG/DL (ref 0.1–1.5)
BUN SERPL-MCNC: 25 MG/DL (ref 8–22)
CALCIUM SERPL-MCNC: 9.6 MG/DL (ref 8.4–10.2)
CHLORIDE SERPL-SCNC: 107 MMOL/L (ref 96–112)
CHOLEST SERPL-MCNC: 156 MG/DL (ref 100–199)
CO2 SERPL-SCNC: 20 MMOL/L (ref 20–33)
CREAT SERPL-MCNC: 1.02 MG/DL (ref 0.5–1.4)
EOSINOPHIL # BLD AUTO: 0.05 K/UL (ref 0–0.51)
EOSINOPHIL NFR BLD: 0.6 % (ref 0–6.9)
ERYTHROCYTE [DISTWIDTH] IN BLOOD BY AUTOMATED COUNT: 40.5 FL (ref 35.9–50)
FASTING STATUS PATIENT QL REPORTED: NORMAL
GLOBULIN SER CALC-MCNC: 3.6 G/DL (ref 1.9–3.5)
GLUCOSE SERPL-MCNC: 95 MG/DL (ref 65–99)
HCT VFR BLD AUTO: 54.4 % (ref 42–52)
HDLC SERPL-MCNC: 54 MG/DL
HGB BLD-MCNC: 18.2 G/DL (ref 14–18)
IMM GRANULOCYTES # BLD AUTO: 0.04 K/UL (ref 0–0.11)
IMM GRANULOCYTES NFR BLD AUTO: 0.5 % (ref 0–0.9)
LDLC SERPL CALC-MCNC: 91 MG/DL
LYMPHOCYTES # BLD AUTO: 1.89 K/UL (ref 1–4.8)
LYMPHOCYTES NFR BLD: 22.4 % (ref 22–41)
MCH RBC QN AUTO: 31.3 PG (ref 27–33)
MCHC RBC AUTO-ENTMCNC: 33.5 G/DL (ref 33.7–35.3)
MCV RBC AUTO: 93.5 FL (ref 81.4–97.8)
MONOCYTES # BLD AUTO: 0.57 K/UL (ref 0–0.85)
MONOCYTES NFR BLD AUTO: 6.8 % (ref 0–13.4)
NEUTROPHILS # BLD AUTO: 5.84 K/UL (ref 1.82–7.42)
NEUTROPHILS NFR BLD: 69.2 % (ref 44–72)
NRBC # BLD AUTO: 0 K/UL
NRBC BLD-RTO: 0 /100 WBC
PLATELET # BLD AUTO: 246 K/UL (ref 164–446)
PMV BLD AUTO: 8.9 FL (ref 9–12.9)
POTASSIUM SERPL-SCNC: 4.4 MMOL/L (ref 3.6–5.5)
PROT SERPL-MCNC: 8.4 G/DL (ref 6–8.2)
RBC # BLD AUTO: 5.82 M/UL (ref 4.7–6.1)
SODIUM SERPL-SCNC: 141 MMOL/L (ref 135–145)
TRIGL SERPL-MCNC: 55 MG/DL (ref 0–149)
WBC # BLD AUTO: 8.4 K/UL (ref 4.8–10.8)

## 2020-10-12 PROCEDURE — 80061 LIPID PANEL: CPT

## 2020-10-12 PROCEDURE — 80053 COMPREHEN METABOLIC PANEL: CPT

## 2020-10-12 PROCEDURE — 36415 COLL VENOUS BLD VENIPUNCTURE: CPT

## 2020-10-12 PROCEDURE — 85025 COMPLETE CBC W/AUTO DIFF WBC: CPT

## 2020-10-16 ENCOUNTER — OFFICE VISIT (OUTPATIENT)
Dept: MEDICAL GROUP | Facility: MEDICAL CENTER | Age: 40
End: 2020-10-16
Payer: OTHER GOVERNMENT

## 2020-10-16 VITALS
HEART RATE: 62 BPM | BODY MASS INDEX: 29.69 KG/M2 | DIASTOLIC BLOOD PRESSURE: 80 MMHG | WEIGHT: 224 LBS | SYSTOLIC BLOOD PRESSURE: 120 MMHG | HEIGHT: 73 IN | OXYGEN SATURATION: 97 % | TEMPERATURE: 97.5 F

## 2020-10-16 DIAGNOSIS — R74.8 ELEVATED ALKALINE PHOSPHATASE LEVEL: ICD-10-CM

## 2020-10-16 DIAGNOSIS — R71.8 ELEVATED HEMATOCRIT: ICD-10-CM

## 2020-10-16 DIAGNOSIS — R89.9 ABNORMAL LABORATORY TEST: ICD-10-CM

## 2020-10-16 PROCEDURE — 99214 OFFICE O/P EST MOD 30 MIN: CPT | Performed by: FAMILY MEDICINE

## 2020-10-16 ASSESSMENT — FIBROSIS 4 INDEX: FIB4 SCORE: 0.78

## 2020-10-16 NOTE — PROGRESS NOTES
Subjective:     CC: Diagnoses of Elevated hematocrit, Elevated alkaline phosphatase level, and Abnormal laboratory test were pertinent to this visit.    HPI: Patient is a 39 y.o. male established patient who presents today to discuss lab results    Elevated hemoglobin/hematocrit  Elevated alk phos  Elevated globulin and protein  Abnormal laboratory test  New problem.  The patient is here today to review lab results.  He had a couple abnormalities include an elevated hemoglobin and hematocrit, elevated BUN, elevated globulin and protein, elevated alk phos.  The patient has been doing quite well, exercising regularly, down around 20 pounds since last visit.  He does state that he was quite dehydrated when he got his labs done had not had anything to drink and he did them at around 3:30 in the afternoon.  He was also fasting at the time.  He does have sleep apnea, has not been using his CPAP machine.      Past Medical History:   Diagnosis Date   • Asthma     daily and prn inhaler    • Benign heart murmur congenital   • Exercise-induced bronchospasm    • Heart burn    • Hyperlipidemia    • Migraine    • Sleep apnea     no CPAP    • Snoring        Social History     Tobacco Use   • Smoking status: Never Smoker   • Smokeless tobacco: Never Used   Substance Use Topics   • Alcohol use: Not Currently   • Drug use: No       Current Outpatient Medications Ordered in Epic   Medication Sig Dispense Refill   • loratadine (CLARITIN) 10 MG Tab TAKE ONE TABLET BY MOUTH ONE TIME DAILY   30 Tab 5   • atorvastatin (LIPITOR) 10 MG Tab TAKE ONE TABLET BY MOUTH ONE TIME DAILY   30 Tab 5   • SYMBICORT 160-4.5 MCG/ACT Aerosol      • EPINEPHrine (EPIPEN) 0.3 MG/0.3ML Solution Auto-injector solution for injection INJECT INTRAMUSCULARLY AS DIRECTED PRF ANAPHYLAXIS     • Carboxymethylcellulose Sodium 0.25 % Solution      • fluticasone (FLONASE) 50 MCG/ACT nasal spray Spray 2 Sprays in nose every day.     • Probiotic Product (PROBIOTIC DAILY PO)  "Take  by mouth every day.     • Ibuprofen (ADVIL PO) Take  by mouth as needed.     • therapeutic multivitamin-minerals (THERAGRAN-M) Tab Take 1 Tab by mouth every day.     • Lifitegrast (XIIDRA) 5 % Solution by Ophthalmic route.     • Bepotastine Besilate (BEPREVE) 1.5 % Solution by Ophthalmic route.     • ketoconazole (NIZORAL) 2 % Cream Apply small amount on affected areas BID x 2 weeks or until rash resolves. 1 Tube 1   • triamcinolone (ARISTOCORT) 0.5 % ointment Apply small amount to affected areas BID for up to 10 days as needed for rash 1 Tube 1   • sumatriptan (IMITREX) 100 MG tablet Take 1 Tab by mouth Once PRN for Migraine for 1 dose. 10 Tab 3   • albuterol (VENTOLIN HFA) 108 (90 BASE) MCG/ACT AERS Inhale 2 Puffs by mouth every four hours as needed for Shortness of Breath. 1 Inhaler 3   • fexofenadine (ALLEGRA) 180 MG tablet        No current Epic-ordered facility-administered medications on file.        Allergies:  Patient has no known allergies.    Health Maintenance: Completed    ROS:  Pulm: no sob, no cough  CV: no chest pain, no palpitations      Objective:       Exam:  /80 (BP Location: Left arm, Patient Position: Sitting, BP Cuff Size: Adult long)   Pulse 62   Temp 36.4 °C (97.5 °F) (Temporal)   Ht 1.854 m (6' 1\")   Wt 101.6 kg (224 lb)   SpO2 97%   BMI 29.55 kg/m²  Body mass index is 29.55 kg/m².    General: Normal appearing. No distress.  HEAD: NCAT  EYES: conjunctiva clear, lids without ptosis, pupils equal and reactive to light  EARS: ears normal shape and contour.  MOUTH: normal dentition   Neck:  Normal ROM  Pulmonary: Normal effort. Normal respiratory rate.  Cardiovascular: Well perfused. No LE edema  Neurologic: Grossly normal, no focal deficits  Skin: Warm and dry.  No obvious lesions.  Musculoskeletal: Normal gait and station.   Psych: Normal mood and affect. Alert and oriented x3. Judgment and insight is normal.    Labs: 10/12/20 Results reviewed and discussed with the patient, " questions answered.    Assessment & Plan:     39 y.o. male with the following -     1. Elevated hematocrit  New problem.  Possibly due to untreated ABDELRAHMAN versus dehydration.  Plan to repeat CBC next month.  Advised patient to ensure that he is well-hydrated.  - CBC WITH DIFFERENTIAL; Future    2. Elevated alkaline phosphatase level  New problem.  Mild.  Alk phos 106.  AST and ALT improved since last check.  Repeat metabolic panel ordered for next month.  - Comp Metabolic Panel; Future    3. Abnormal laboratory test  See above      Return if symptoms worsen or fail to improve.    Please note that this dictation was created using voice recognition software. I have made every reasonable attempt to correct obvious errors, but I expect that there are errors of grammar and possibly content that I did not discover before finalizing the note.

## 2020-10-16 NOTE — ASSESSMENT & PLAN NOTE
New problem.  The patient is here today to review lab results.  He had a couple abnormalities include an elevated hemoglobin and hematocrit, elevated BUN, elevated globulin and protein, elevated alk phos.  The patient has been doing quite well, exercising regularly, down around 20 pounds since last visit.  He does state that he was quite dehydrated when he got his labs done had not had anything to drink and he did them at around 3:30 in the afternoon.  He was also fasting at the time.  He does have sleep apnea, has not been using his CPAP machine.

## 2020-11-06 DIAGNOSIS — T78.40XD ALLERGIC REACTION, SUBSEQUENT ENCOUNTER: ICD-10-CM

## 2020-11-24 NOTE — PROGRESS NOTES
Assumed care of patient at 1900    Pt is A&O x 4  Pain 6/10.  Pt medicated per MAR  Pt denies nausea  Tolerating Diet  Nasal incision steri strips in place.  Scant amount of old draiange   Positive Urine Void   Positive Flatus  Positive BM Void (PTA)  Up self   SCD's on  Bed in lowest position and locked.  Bed alarm not indicated per Buffy Alegria Assessment.   Reviewed plan of care with patient, bed in lowest position and locked, pt resting comfortably now, call light within reach, all needs met at this time   Breath sounds clear and equal bilaterally.

## 2020-12-17 ENCOUNTER — HOSPITAL ENCOUNTER (OUTPATIENT)
Dept: LAB | Facility: MEDICAL CENTER | Age: 40
End: 2020-12-17
Attending: FAMILY MEDICINE
Payer: OTHER GOVERNMENT

## 2020-12-17 DIAGNOSIS — R71.8 ELEVATED HEMATOCRIT: ICD-10-CM

## 2020-12-17 DIAGNOSIS — R74.8 ELEVATED ALKALINE PHOSPHATASE LEVEL: ICD-10-CM

## 2020-12-17 LAB
ALBUMIN SERPL BCP-MCNC: 4.6 G/DL (ref 3.2–4.9)
ALBUMIN/GLOB SERPL: 1.8 G/DL
ALP SERPL-CCNC: 85 U/L (ref 30–99)
ALT SERPL-CCNC: 40 U/L (ref 2–50)
ANION GAP SERPL CALC-SCNC: 9 MMOL/L (ref 7–16)
AST SERPL-CCNC: 27 U/L (ref 12–45)
BASOPHILS # BLD AUTO: 0.8 % (ref 0–1.8)
BASOPHILS # BLD: 0.04 K/UL (ref 0–0.12)
BILIRUB SERPL-MCNC: 0.5 MG/DL (ref 0.1–1.5)
BUN SERPL-MCNC: 19 MG/DL (ref 8–22)
CALCIUM SERPL-MCNC: 8.9 MG/DL (ref 8.5–10.5)
CHLORIDE SERPL-SCNC: 104 MMOL/L (ref 96–112)
CO2 SERPL-SCNC: 24 MMOL/L (ref 20–33)
CREAT SERPL-MCNC: 0.87 MG/DL (ref 0.5–1.4)
EOSINOPHIL # BLD AUTO: 0.16 K/UL (ref 0–0.51)
EOSINOPHIL NFR BLD: 3.3 % (ref 0–6.9)
ERYTHROCYTE [DISTWIDTH] IN BLOOD BY AUTOMATED COUNT: 42.2 FL (ref 35.9–50)
GLOBULIN SER CALC-MCNC: 2.6 G/DL (ref 1.9–3.5)
GLUCOSE SERPL-MCNC: 94 MG/DL (ref 65–99)
HCT VFR BLD AUTO: 50.7 % (ref 42–52)
HGB BLD-MCNC: 16.7 G/DL (ref 14–18)
IMM GRANULOCYTES # BLD AUTO: 0.02 K/UL (ref 0–0.11)
IMM GRANULOCYTES NFR BLD AUTO: 0.4 % (ref 0–0.9)
LYMPHOCYTES # BLD AUTO: 1.77 K/UL (ref 1–4.8)
LYMPHOCYTES NFR BLD: 36.3 % (ref 22–41)
MCH RBC QN AUTO: 31.7 PG (ref 27–33)
MCHC RBC AUTO-ENTMCNC: 32.9 G/DL (ref 33.7–35.3)
MCV RBC AUTO: 96.2 FL (ref 81.4–97.8)
MONOCYTES # BLD AUTO: 0.46 K/UL (ref 0–0.85)
MONOCYTES NFR BLD AUTO: 9.4 % (ref 0–13.4)
NEUTROPHILS # BLD AUTO: 2.42 K/UL (ref 1.82–7.42)
NEUTROPHILS NFR BLD: 49.8 % (ref 44–72)
NRBC # BLD AUTO: 0 K/UL
NRBC BLD-RTO: 0 /100 WBC
PLATELET # BLD AUTO: 228 K/UL (ref 164–446)
PMV BLD AUTO: 9.3 FL (ref 9–12.9)
POTASSIUM SERPL-SCNC: 4.2 MMOL/L (ref 3.6–5.5)
PROT SERPL-MCNC: 7.2 G/DL (ref 6–8.2)
RBC # BLD AUTO: 5.27 M/UL (ref 4.7–6.1)
SODIUM SERPL-SCNC: 137 MMOL/L (ref 135–145)
WBC # BLD AUTO: 4.9 K/UL (ref 4.8–10.8)

## 2020-12-17 PROCEDURE — 36415 COLL VENOUS BLD VENIPUNCTURE: CPT

## 2020-12-17 PROCEDURE — 80053 COMPREHEN METABOLIC PANEL: CPT

## 2020-12-17 PROCEDURE — 85025 COMPLETE CBC W/AUTO DIFF WBC: CPT

## 2021-01-14 ENCOUNTER — TELEMEDICINE (OUTPATIENT)
Dept: MEDICAL GROUP | Facility: MEDICAL CENTER | Age: 41
End: 2021-01-14
Payer: OTHER GOVERNMENT

## 2021-01-14 ENCOUNTER — HOSPITAL ENCOUNTER (OUTPATIENT)
Dept: LAB | Facility: MEDICAL CENTER | Age: 41
End: 2021-01-14
Attending: FAMILY MEDICINE
Payer: OTHER GOVERNMENT

## 2021-01-14 VITALS — BODY MASS INDEX: 28.28 KG/M2 | HEIGHT: 73 IN | TEMPERATURE: 98.8 F | WEIGHT: 213.4 LBS

## 2021-01-14 DIAGNOSIS — R69 ILLNESS: ICD-10-CM

## 2021-01-14 DIAGNOSIS — J45.40 MODERATE PERSISTENT ASTHMA WITHOUT COMPLICATION: ICD-10-CM

## 2021-01-14 LAB — COVID ORDER STATUS COVID19: NORMAL

## 2021-01-14 PROCEDURE — C9803 HOPD COVID-19 SPEC COLLECT: HCPCS

## 2021-01-14 PROCEDURE — 99999 PR NO CHARGE: CPT | Mod: CR | Performed by: FAMILY MEDICINE

## 2021-01-14 PROCEDURE — U0003 INFECTIOUS AGENT DETECTION BY NUCLEIC ACID (DNA OR RNA); SEVERE ACUTE RESPIRATORY SYNDROME CORONAVIRUS 2 (SARS-COV-2) (CORONAVIRUS DISEASE [COVID-19]), AMPLIFIED PROBE TECHNIQUE, MAKING USE OF HIGH THROUGHPUT TECHNOLOGIES AS DESCRIBED BY CMS-2020-01-R: HCPCS

## 2021-01-14 PROCEDURE — U0005 INFEC AGEN DETEC AMPLI PROBE: HCPCS

## 2021-01-14 RX ORDER — LIFITEGRAST 50 MG/ML
SOLUTION/ DROPS OPHTHALMIC
COMMUNITY
Start: 2020-10-25

## 2021-01-14 ASSESSMENT — PATIENT HEALTH QUESTIONNAIRE - PHQ9: CLINICAL INTERPRETATION OF PHQ2 SCORE: 0

## 2021-01-14 ASSESSMENT — FIBROSIS 4 INDEX: FIB4 SCORE: 0.75

## 2021-01-15 ENCOUNTER — TELEPHONE (OUTPATIENT)
Dept: MEDICAL GROUP | Facility: MEDICAL CENTER | Age: 41
End: 2021-01-15

## 2021-01-15 LAB
SARS-COV-2 RNA RESP QL NAA+PROBE: DETECTED
SPECIMEN SOURCE: ABNORMAL

## 2021-01-15 NOTE — TELEPHONE ENCOUNTER
Spoke with patient regarding + COVID results. Symptoms started 1/11/20. He is already Improving.  Denies any SOB or chest pain.   No fevers.   Advised to quarantine until 1/21/21.   Reviewed ER precautions.   Advised baby aspirin for the next couple weeks.

## 2021-02-22 DIAGNOSIS — H53.9 VISION CHANGES: ICD-10-CM

## 2021-02-23 ENCOUNTER — OFFICE VISIT (OUTPATIENT)
Dept: MEDICAL GROUP | Facility: MEDICAL CENTER | Age: 41
End: 2021-02-23
Payer: OTHER GOVERNMENT

## 2021-02-23 VITALS
WEIGHT: 225 LBS | BODY MASS INDEX: 29.82 KG/M2 | TEMPERATURE: 97.7 F | DIASTOLIC BLOOD PRESSURE: 78 MMHG | SYSTOLIC BLOOD PRESSURE: 128 MMHG | HEIGHT: 73 IN | HEART RATE: 64 BPM | OXYGEN SATURATION: 97 %

## 2021-02-23 DIAGNOSIS — M25.551 RIGHT HIP PAIN: ICD-10-CM

## 2021-02-23 DIAGNOSIS — G47.33 OSA (OBSTRUCTIVE SLEEP APNEA): ICD-10-CM

## 2021-02-23 DIAGNOSIS — N52.9 VASCULOGENIC ERECTILE DYSFUNCTION, UNSPECIFIED VASCULOGENIC ERECTILE DYSFUNCTION TYPE: ICD-10-CM

## 2021-02-23 PROBLEM — L29.9 SCALP ITCH: Status: RESOLVED | Noted: 2019-05-24 | Resolved: 2021-02-23

## 2021-02-23 PROBLEM — R03.0 ELEVATED BLOOD PRESSURE READING: Status: RESOLVED | Noted: 2019-05-24 | Resolved: 2021-02-23

## 2021-02-23 PROBLEM — R89.9 ABNORMAL LABORATORY TEST: Status: RESOLVED | Noted: 2020-10-16 | Resolved: 2021-02-23

## 2021-02-23 PROCEDURE — 99214 OFFICE O/P EST MOD 30 MIN: CPT | Performed by: FAMILY MEDICINE

## 2021-02-23 RX ORDER — AZELASTINE 1 MG/ML
SPRAY, METERED NASAL
COMMUNITY
Start: 2021-02-20

## 2021-02-23 RX ORDER — SILDENAFIL 50 MG/1
50 TABLET, FILM COATED ORAL PRN
Qty: 10 TABLET | Refills: 1 | Status: SHIPPED | OUTPATIENT
Start: 2021-02-23

## 2021-02-23 ASSESSMENT — FIBROSIS 4 INDEX: FIB4 SCORE: 0.75

## 2021-02-23 NOTE — PROGRESS NOTES
Subjective:     CC: Diagnoses of Vasculogenic erectile dysfunction, unspecified vasculogenic erectile dysfunction type, Right hip pain, and ABDELRAHMAN (obstructive sleep apnea) were pertinent to this visit.    HPI: Patient is a 40 y.o. male established patient who presents today with the following concerns.      Vasculogenic erectile dysfunction  New problem.  Patient states that he had one episode of erectile dysfunction.  He has a new girlfriend, since December.  He had been drinking alcohol that night and had essentially stopped drinking over a year ago.  Interested in getting a prescription medication.    Right hip pain  New problem. Present x 3 months. Right lateral hip/low back. Improves when wearing a weight belt. Can become quite severe.   After sitting for long periods of time he can get numbness in the right leg.   Pain in pin point in the right buttock.     ABDELRAHMAN (obstructive sleep apnea)  Chronic problem.  Not using cpap      Past Medical History:   Diagnosis Date   • Asthma     daily and prn inhaler    • Benign heart murmur congenital   • Exercise-induced bronchospasm    • Heart burn    • Hyperlipidemia    • Migraine    • Sleep apnea     no CPAP    • Snoring        Social History     Tobacco Use   • Smoking status: Never Smoker   • Smokeless tobacco: Never Used   Substance Use Topics   • Alcohol use: Not Currently   • Drug use: No       Current Outpatient Medications Ordered in Epic   Medication Sig Dispense Refill   • azelastine (ASTELIN) 137 MCG/SPRAY nasal spray      • sildenafil citrate (VIAGRA) 50 MG tablet Take 1 tablet by mouth as needed for Erectile Dysfunction. 10 tablet 1   • loratadine (CLARITIN) 10 MG Tab TAKE ONE TABLET BY MOUTH ONE TIME DAILY    90 tablet 1   • atorvastatin (LIPITOR) 10 MG Tab TAKE ONE TABLET BY MOUTH ONE TIME DAILY    90 tablet 3   • XIIDRA 5 % Solution      • SYMBICORT 160-4.5 MCG/ACT Aerosol      • fexofenadine (ALLEGRA) 180 MG tablet      • EPINEPHrine (EPIPEN) 0.3 MG/0.3ML  "Solution Auto-injector solution for injection INJECT INTRAMUSCULARLY AS DIRECTED PRF ANAPHYLAXIS     • fluticasone (FLONASE) 50 MCG/ACT nasal spray Spray 2 Sprays in nose every day.     • Probiotic Product (PROBIOTIC DAILY PO) Take  by mouth every day.     • Ibuprofen (ADVIL PO) Take  by mouth as needed.     • therapeutic multivitamin-minerals (THERAGRAN-M) Tab Take 1 Tab by mouth every day.     • Bepotastine Besilate (BEPREVE) 1.5 % Solution by Ophthalmic route.     • ketoconazole (NIZORAL) 2 % Cream Apply small amount on affected areas BID x 2 weeks or until rash resolves. 1 Tube 1   • triamcinolone (ARISTOCORT) 0.5 % ointment Apply small amount to affected areas BID for up to 10 days as needed for rash 1 Tube 1   • sumatriptan (IMITREX) 100 MG tablet Take 1 Tab by mouth Once PRN for Migraine for 1 dose. 10 Tab 3   • albuterol (VENTOLIN HFA) 108 (90 BASE) MCG/ACT AERS Inhale 2 Puffs by mouth every four hours as needed for Shortness of Breath. 1 Inhaler 3   • Carboxymethylcellulose Sodium 0.25 % Solution      • Lifitegrast (XIIDRA) 5 % Solution by Ophthalmic route.       No current [facility]-ordered facility-administered medications on file.       Allergies:  Patient has no known allergies.    Health Maintenance: Completed    ROS:  Pulm: no sob, no cough  CV: no chest pain, no palpitations        Objective:       Exam:  /78 (BP Location: Left arm, Patient Position: Sitting, BP Cuff Size: Adult long)   Pulse 64   Temp 36.5 °C (97.7 °F) (Temporal)   Ht 1.854 m (6' 1\")   Wt 102 kg (225 lb)   SpO2 97%   BMI 29.69 kg/m²  Body mass index is 29.69 kg/m².    General: Normal appearing. No distress.  HEAD: NCAT  EYES: conjunctiva clear, lids without ptosis, pupils equal and reactive to light  EARS: ears normal shape and contour.  MOUTH: normal dentition   Neck:  Normal ROM  Pulmonary: Normal effort. Normal respiratory rate.  Cardiovascular: Well perfused. No LE edema  Neurologic: Grossly normal, no focal " deficits  Skin: Warm and dry.  No obvious lesions.  Musculoskeletal: Normal gait and station.   Psych: Normal mood and affect. Alert and oriented x3. Judgment and insight is normal.    Labs: 12/17/2020 results reviewed and discussed with the patient, questions answered.    Assessment & Plan:     40 y.o. male with the following -     1. Vasculogenic erectile dysfunction, unspecified vasculogenic erectile dysfunction type  New problem.  Prescription given for Viagra as needed.  - sildenafil citrate (VIAGRA) 50 MG tablet; Take 1 tablet by mouth as needed for Erectile Dysfunction.  Dispense: 10 tablet; Refill: 1    2. Right hip pain  New problem.  Tenderness palpation in the right buttock, also reports numbness after sitting for too long.  Possibly sciatic nerve impingement?.  Referral placed to physical therapy.  - REFERRAL TO PHYSICAL THERAPY    3. ABDELRAHMAN (obstructive sleep apnea)  Chronic problem, not compliant, not currently using his sleep apnea machine, however, states he is overall doing well.  Down 20 pounds.      No follow-ups on file.    Please note that this dictation was created using voice recognition software. I have made every reasonable attempt to correct obvious errors, but I expect that there are errors of grammar and possibly content that I did not discover before finalizing the note.

## 2021-02-23 NOTE — ASSESSMENT & PLAN NOTE
New problem. Has new girlfriend. Stopped drinking but now drinking a bit again. Issues x 1. Had been drinking that day.

## 2021-02-23 NOTE — ASSESSMENT & PLAN NOTE
New problem. Present x 3 months. Right lateral hip/low back. Improves when wearing a weight belt. Can become quite severe.   After sitting for long periods of time he can get numbness in the right leg.   Pain in point in the right buttock.

## 2021-03-24 DIAGNOSIS — R21 RASH: ICD-10-CM

## 2021-03-25 RX ORDER — TRIAMCINOLONE ACETONIDE 5 MG/G
OINTMENT TOPICAL
Qty: 15 G | Refills: 0 | Status: SHIPPED | OUTPATIENT
Start: 2021-03-25

## 2021-04-01 ENCOUNTER — OFFICE VISIT (OUTPATIENT)
Dept: MEDICAL GROUP | Facility: MEDICAL CENTER | Age: 41
End: 2021-04-01
Payer: OTHER GOVERNMENT

## 2021-04-01 VITALS
SYSTOLIC BLOOD PRESSURE: 130 MMHG | HEIGHT: 73 IN | HEART RATE: 65 BPM | BODY MASS INDEX: 29.55 KG/M2 | WEIGHT: 223 LBS | DIASTOLIC BLOOD PRESSURE: 72 MMHG | OXYGEN SATURATION: 98 % | TEMPERATURE: 97 F

## 2021-04-01 DIAGNOSIS — J45.40 MODERATE PERSISTENT ASTHMA WITHOUT COMPLICATION: ICD-10-CM

## 2021-04-01 DIAGNOSIS — M25.551 RIGHT HIP PAIN: ICD-10-CM

## 2021-04-01 DIAGNOSIS — G43.909 MIGRAINE WITHOUT STATUS MIGRAINOSUS, NOT INTRACTABLE, UNSPECIFIED MIGRAINE TYPE: ICD-10-CM

## 2021-04-01 PROCEDURE — 99214 OFFICE O/P EST MOD 30 MIN: CPT | Performed by: FAMILY MEDICINE

## 2021-04-01 RX ORDER — LOTEPREDNOL ETABONATE 2.5 MG/ML
SUSPENSION/ DROPS OPHTHALMIC
COMMUNITY
Start: 2021-03-24

## 2021-04-01 ASSESSMENT — FIBROSIS 4 INDEX: FIB4 SCORE: 0.75

## 2021-04-01 NOTE — ASSESSMENT & PLAN NOTE
Chronic problem. Well controlled. Denies any SOB or chronic cough.   Uses albuterol for workouts. Otherwise rarely requires the albuterol.   Spirometry shows no obstruction today.

## 2021-04-01 NOTE — ASSESSMENT & PLAN NOTE
Chronic problem. Seeing a chiropractor. Seems to be helping quite a bit. Also starting PT next week.

## 2021-04-01 NOTE — PROGRESS NOTES
Subjective:     CC: Diagnoses of Moderate persistent asthma without complication, Migraine without status migrainosus, not intractable, unspecified migraine type, and Right hip pain were pertinent to this visit.    HPI: Patient is a 40 y.o. male established patient who presents today for general follow up.       Moderate persistent asthma without complication  Chronic problem. Well controlled. Denies any SOB or chronic cough.   Uses albuterol for workouts. Otherwise rarely requires the albuterol.   Spirometry shows no obstruction today.     Migraine headache  Chronic problem. Noted some increased headaches over the weekend, likely from sleeping on the couch. Usually uses Advil, has imitrex as needed. Generally well controlled though.     Right hip pain  Chronic problem. Seeing a chiropractor. Seems to be helping quite a bit. Also starting PT next week.       Past Medical History:   Diagnosis Date   • Asthma     daily and prn inhaler    • Benign heart murmur congenital   • Exercise-induced bronchospasm    • Heart burn    • Hyperlipidemia    • Migraine    • Sleep apnea     no CPAP    • Snoring        Social History     Tobacco Use   • Smoking status: Never Smoker   • Smokeless tobacco: Never Used   Substance Use Topics   • Alcohol use: Not Currently   • Drug use: No       Current Outpatient Medications Ordered in Epic   Medication Sig Dispense Refill   • EYSUVIS 0.25 % Suspension      • triamcinolone (ARISTOCORT) 0.5 % ointment apply a small amount topically to affected area(S) twice daily for up to 10 days as needed for rash 15 g 0   • azelastine (ASTELIN) 137 MCG/SPRAY nasal spray      • sildenafil citrate (VIAGRA) 50 MG tablet Take 1 tablet by mouth as needed for Erectile Dysfunction. 10 tablet 1   • loratadine (CLARITIN) 10 MG Tab TAKE ONE TABLET BY MOUTH ONE TIME DAILY    90 tablet 1   • atorvastatin (LIPITOR) 10 MG Tab TAKE ONE TABLET BY MOUTH ONE TIME DAILY    90 tablet 3   • XIIDRA 5 % Solution      •  "SYMBICORT 160-4.5 MCG/ACT Aerosol      • fexofenadine (ALLEGRA) 180 MG tablet      • EPINEPHrine (EPIPEN) 0.3 MG/0.3ML Solution Auto-injector solution for injection INJECT INTRAMUSCULARLY AS DIRECTED PRF ANAPHYLAXIS     • fluticasone (FLONASE) 50 MCG/ACT nasal spray Spray 2 Sprays in nose every day.     • Ibuprofen (ADVIL PO) Take  by mouth as needed.     • therapeutic multivitamin-minerals (THERAGRAN-M) Tab Take 1 Tab by mouth every day.     • Bepotastine Besilate (BEPREVE) 1.5 % Solution by Ophthalmic route.     • ketoconazole (NIZORAL) 2 % Cream Apply small amount on affected areas BID x 2 weeks or until rash resolves. 1 Tube 1   • sumatriptan (IMITREX) 100 MG tablet Take 1 Tab by mouth Once PRN for Migraine for 1 dose. 10 Tab 3   • albuterol (VENTOLIN HFA) 108 (90 BASE) MCG/ACT AERS Inhale 2 Puffs by mouth every four hours as needed for Shortness of Breath. 1 Inhaler 3     No current Epic-ordered facility-administered medications on file.       Allergies:  Patient has no known allergies.    Health Maintenance: Completed    ROS:  Pulm: no sob, no cough  CV: no chest pain, no palpitations      Objective:       Exam:  /72 (BP Location: Left arm, Patient Position: Sitting, BP Cuff Size: Adult long)   Pulse 65   Temp 36.1 °C (97 °F) (Temporal)   Ht 1.854 m (6' 1\")   Wt 101 kg (223 lb)   SpO2 98%   BMI 29.42 kg/m²  Body mass index is 29.42 kg/m².    General: Normal appearing. No distress.  HEAD: NCAT  EYES: conjunctiva clear, lids without ptosis, pupils equal and reactive to light  EARS: ears normal shape and contour.  MOUTH: normal dentition   Neck:  Normal ROM  Pulmonary: Normal effort. Normal respiratory rate.  Cardiovascular: Well perfused. No LE edema  Neurologic: Grossly normal, no focal deficits  Skin: Warm and dry.  No obvious lesions.  Musculoskeletal: Normal gait and station.   Psych: Normal mood and affect. Alert and oriented x3. Judgment and insight is normal.      Labs: 12/17/20 Results " reviewed and discussed with the patient, questions answered.    Assessment & Plan:     40 y.o. male with the following -     1. Moderate persistent asthma without complication  Chronic problem, doing quite well on the Symbicort.  Spirometry done today, showed FVC 96%, FEV1 102%, FEV1/FVC ratio 105%.  Session quality C.  Plan to continue current dosage.  Advised to pretreat with albuterol prior to exercise.    2. Migraine without status migrainosus, not intractable, unspecified migraine type  Chronic problem, generally infrequent migraines.  Advil helps.  Does have Imitrex as needed.    3. Right hip pain  Chronic problem, working with chiropractor, has appointment to start PT next week.  Plan to continue to monitor.      Return in about 6 months (around 10/1/2021), or if symptoms worsen or fail to improve.    Please note that this dictation was created using voice recognition software. I have made every reasonable attempt to correct obvious errors, but I expect that there are errors of grammar and possibly content that I did not discover before finalizing the note.

## 2021-04-01 NOTE — ASSESSMENT & PLAN NOTE
Chronic problem. Noted some increased headaches over the weekend, likely from sleeping on the couch. Usually uses Advil, has imitrex as needed. Generally well controlled through.

## 2021-04-08 ENCOUNTER — APPOINTMENT (OUTPATIENT)
Dept: PHYSICAL THERAPY | Facility: REHABILITATION | Age: 41
End: 2021-04-08
Attending: FAMILY MEDICINE
Payer: OTHER GOVERNMENT

## 2021-04-13 ENCOUNTER — APPOINTMENT (OUTPATIENT)
Dept: PHYSICAL THERAPY | Facility: REHABILITATION | Age: 41
End: 2021-04-13
Payer: OTHER GOVERNMENT

## 2021-04-15 ENCOUNTER — APPOINTMENT (OUTPATIENT)
Dept: PHYSICAL THERAPY | Facility: REHABILITATION | Age: 41
End: 2021-04-15
Payer: OTHER GOVERNMENT

## 2021-04-15 ENCOUNTER — PHYSICAL THERAPY (OUTPATIENT)
Dept: PHYSICAL THERAPY | Facility: REHABILITATION | Age: 41
End: 2021-04-15
Attending: FAMILY MEDICINE
Payer: OTHER GOVERNMENT

## 2021-04-15 DIAGNOSIS — M25.551 RIGHT HIP PAIN: ICD-10-CM

## 2021-04-15 PROCEDURE — 97161 PT EVAL LOW COMPLEX 20 MIN: CPT

## 2021-04-15 PROCEDURE — 97110 THERAPEUTIC EXERCISES: CPT

## 2021-04-15 SDOH — ECONOMIC STABILITY: GENERAL: QUALITY OF LIFE: GOOD

## 2021-04-15 ASSESSMENT — ENCOUNTER SYMPTOMS
PAIN SCALE AT LOWEST: 4
QUALITY: TIGHTNESS
PAIN SCALE: 4
QUALITY: DULL ACHE
PAIN SCALE AT HIGHEST: 9
QUALITY: ACHING

## 2021-04-15 NOTE — OP THERAPY EVALUATION
Outpatient Physical Therapy  INITIAL EVALUATION    [facility] Physical [address]  [address]  [place] [address]  Phone:  [phone]  Fax:  [phone]    Date of Evaluation: 04/15/2021    Patient: Darren Watters  YOB: 1980  MRN: [id number]     Referring Provider: Renata Hernandez M.D.  [address] [facility], [place], [facility]  [address]  [place] [address]   Referring Diagnosis Right hip pain [M25.551]     Time Calculation     Start: 830   Stop: 920  Total: 50 minutes         Chief Complaint: Hip Problem    Visit Diagnoses     ICD-10-CM   1. Right hip pain  M25.551       No data found  Subjective:   History of Present Illness:     Date of onset:  4/15/2020    Mechanism of injury:  Chronic Rt hip pain. Pt indicated pain in Rt glutes.   Pt reports a year or so ago noticed his right foot was rotated outward and his  encouraged him to foam roll glutes. Did improve symptoms somewhat for short periods.    When Sitting in planes or for prolonged periods in cars his Rt LE goes numb/painful. Pt also reports numbness when sitting less than an hour.He is able to get up and walk around which dec the pain intensity but is always noticeable. Pt reports feeling it is in one spot rather than general. Pain also improved in left side lying letting Rt LE hang over towards the floor when on the couch.     Pt has seen chiropractor two sessions with some relief.     Pt participates in cross fit frequently and has previously used a foam roller over glutes with some relief. 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   Quality of life:  Good  Sleep disturbance:  Not disrupted  Pain:     Current pain ratin    At best pain ratin    At worst pain ratin    Location:  Right glute/buttock    Quality:  Aching, dull ache and tightness    Pain Comments::  Quality: grabbing, ache  Treatments:     Current treatment:  Chiropractic    Treatment  Comments:  Pt has seen chirpractor twice with significant relief for 2-3 days after first session and min dec in symptoms after second visit. Last visit 3/29/21.  Patient Goals:     Patient goals for therapy:  Decreased pain      Past Medical History:   Diagnosis Date   • Asthma     daily and prn inhaler    • Benign heart murmur congenital   • Exercise-induced bronchospasm    • Heart burn    • Hyperlipidemia    • Migraine    • Sleep apnea     no CPAP    • Snoring      Past Surgical History:   Procedure Laterality Date   • PB REPAIR OF NASAL SEPTUM  1/7/2020    Procedure: SEPTOPLASTY, NOSE;  Surgeon: Kacie Hollis M.D.;  Location: SURGERY SAME DAY [facility];  Service: Ent   • TURBINOPLASTY Bilateral 1/7/2020    Procedure: TURBINOPLASTY;  Surgeon: Kacie Hollis M.D.;  Location: SURGERY SAME DAY [facility];  Service: Ent   • RHINOPLASTY  1/7/2020    Procedure: RHINOPLASTY- WITH GRAFT FUNCTIONAL;  Surgeon: Kacie Hollis M.D.;  Location: SURGERY SAME DAY [facility];  Service: Ent   • TONSILLECTOMY AND ADENOIDECTOMY  1/7/2020    Procedure: TONSILLECTOMY AND ADENOIDECTOMY;  Surgeon: Kacie Hollis M.D.;  Location: SURGERY SAME DAY [facility];  Service: Ent   • CYST EXCISION      local anesth   • EYE SURGERY      LASIK     Social History     Tobacco Use   • Smoking status: Never Smoker   • Smokeless tobacco: Never Used   Substance Use Topics   • Alcohol use: Not Currently     Family and Occupational History     Socioeconomic History   • Marital status: Single     Spouse name: Not on file   • Number of children: Not on file   • Years of education: Not on file   • Highest education level: Not on file   Occupational History   • Not on file       Objective     Static Posture     Comments  Squat Test: Outoeing on Rt LE noted approx 15 degrees with left LE rotation WNL. Good form overall with slight butt wink indicated impaired HS length.    Pelvis: Iliac crest, PSIS, ASIS equal height.    Palpation     Right  "  Tenderness of the gluteus meena, gluteus medius and piriformis.     Active Range of Motion     Lumbar   All lumbar active range of motion within functional limits  Left Hip   Normal active range of motion    Right Hip   Normal active range of motion    Strength:      Left Hip   Normal muscle strength  Planes of Motion   Flexion: 5  Extension: 4  Abduction: 4+    Right Hip   Planes of Motion   Flexion: 5  Extension: 4-  Abduction: 4+    Tests     Left Hip   Negative TANNER, FADIR, piriformis, scour and sign of the buttock.     Right Hip   Positive piriformis.   Negative TANNER, FADIR, scour and sign of the buttock.     Additional Tests Details  Pt reported \"hitting the spot\" while assessing glute/piriformis length with hip flexion/adduction/IR    General Comments     Hip Comments   90-90 HS test: Bilateral 45 degrees            Therapeutic Exercises (CPT 54505):     1. Sidelying hip ab/ext hold, 3 x 60 sec bilat, OTB at ankles, 15 deg ab, 20-30 deg ext    2. Sidelying clamshell holds, 3 x 60 bilat, OTB above knees    3. HS supine stretch, x 60 s    4. Glute stretch, 2 x 60 sec    5. Fire hydrant holds    6. Donkey kicks      Therapeutic Exercise Summary: HEP: supine glute stretch, supine piriformis stretch, side lying ab/ext holds 3 x 60 OTB, side lying clamshell holds 3 x 60 OTB      Time-based treatments/modalities:     Therapeutic Exercise CPT 71797: 20 minutes      Assessment, Response and Plan:   Impairments: pain with function    Assessment details:  Pt is 40 year old male who presents to therapy with persistent right glute pain most notable when sitting for extended periods. Pt had increased tone as well as tenderness with palpation of glute max/prififormis as well as banded tension noted in glute med/min/max near pt reported area of symptoms. Pt did demonstrate tissue restrictions of the glute, piriformis, and hamstrings as well that may be impacting symptoms. Based off other objective findings as well as " subjective report the PT diagnosis will be related to tissue length deficits as well as weakness of hip extensors and abductors contributing to pt pain. Pt will benefit from skilled intervention in order to improve flexibility, and strength in order to decrease pain and return pt to PLOF. Pt to be seen 1-2x per week for 6-8 weeks as needed. Pt educated on functional dry needling as potential intervention. Pt verbalized agreement but a written consent will be obtained before intervention utilized.   Barriers to therapy:  None  Prognosis: good    Goals:   Short Term Goals:   1. Pt will report compliance with HEP 5-7x per week in order to improve flexibly and strength.  2. Pt will subjectively report pain at worst 5/10 or better when standing more than 20 in.   Short term goal time span:  2-4 weeks      Long Term Goals:    1. Pt will subjectively report ability to sit in car for 1 hour with subjective pain 3/10 or less.   2. Pt will demonstrate improve glute strength via MMT 4+/5 or better to appropriately utilize glutes while performing cross fit activities.   3. Pt will demonstrate improved bilateral HS length via 90-90 test lacking 30 degrees or less in order to improve squat mechanics.   Long term goal time span:  6-8 weeks    Plan:   Therapy options:  Physical therapy treatment to continue  Planned therapy interventions:  Hot or Cold Pack Therapy (CPT 52203), Manual Therapy (CPT 50661), Neuromuscular Re-education (CPT 93953), Therapeutic Exercise (CPT 33382) and Therapeutic Activities (CPT 09047)  Frequency:  2x week  Duration in weeks:  8  Duration in visits:  16  Discussed with:  Patient  Plan details:  1-2x per week for 8 weeks.       Functional Assessment Used        Referring provider co-signature:  I have reviewed this plan of care and my co-signature certifies the need for services.    Certification Period: 04/15/2021 to  06/14/21    Physician Signature: ________________________________ Date: ______________

## 2021-04-20 ENCOUNTER — APPOINTMENT (OUTPATIENT)
Dept: PHYSICAL THERAPY | Facility: REHABILITATION | Age: 41
End: 2021-04-20
Payer: OTHER GOVERNMENT

## 2021-04-20 ENCOUNTER — PHYSICAL THERAPY (OUTPATIENT)
Dept: PHYSICAL THERAPY | Facility: REHABILITATION | Age: 41
End: 2021-04-20
Attending: FAMILY MEDICINE
Payer: OTHER GOVERNMENT

## 2021-04-20 DIAGNOSIS — M25.551 RIGHT HIP PAIN: ICD-10-CM

## 2021-04-20 PROCEDURE — 97110 THERAPEUTIC EXERCISES: CPT

## 2021-04-20 NOTE — OP THERAPY DAILY TREATMENT
Outpatient Physical Therapy  DAILY TREATMENT     [facility] Physical [address]  [address]  [place] [address]  Phone:  [phone]  Fax:  [phone]    Date: 04/20/2021    Patient: Darren Watters  YOB: 1980  MRN: [id number]     Time Calculation    Start time: 1530  Stop time: 1600 Time Calculation (min): 30 minutes         Chief Complaint: Hip Problem    Visit #: 2    SUBJECTIVE:  Pt reports significant improvement with symptoms since last session. Including ability to sit linger while at work and less noticeable after the gym. Pt has been performing HEP consistently. Symptoms today described as an ache/gripping/cramp sensation but minimal.    OBJECTIVE:      Therapeutic Exercises (CPT 42383):     1. Sidelying abd/ext holds OTB @ ankles, 2x80 seconds    2. Clamshell holds OTB, 2 x 60 sec    3. Monster walks GTB @ ankle, x 2min fwd/bwd    4. Lateral stepping GTB @ ankle, 3 min, cues for glute activation and dec Rt hip ER    5. Supine glute stretch c manual overpressure, x 60 sec, Rt    20. 6/14/21      Therapeutic Exercise Summary:  HEP: supine glute stretch, supine piriformis stretch, side lying ab/ext holds 3 x 60 OTB, side lying clamshell holds 3 x 60 OTB        Therapeutic Treatments and Modalities:     1. Manual Therapy (CPT 73025), Rt glute/piriformis    Therapeutic Treatment and Modalities Summary: Manual: Trp release with hip in ER targeting glutes and piriformis. Positioned in Prone. Improved hip ER/IR post intervention and pt dec in pain.    Time-based treatments/modalities:    Physical Therapy Timed Treatment Charges  Manual therapy minutes (CPT 06744): 5 minutes  Therapeutic exercise minutes (CPT 58419): 25 minutes      ASSESSMENT:   Pt demo's good stability with isometric holds with endurance emphasis. Will continue to progress as tolerated with plan for GTB next session. Utilized photo for education related to glute min, med, max and piriformis location and  activation. When performing lateral stepping pt reported more glute fatigue on Lt>Rt. Educated this is likely due to prolonged compensation and reliance on Lt due to Rt injury/pain. 1 VC required to dec hip er compensation on Rt during lateral stepping. Pt responded well to manual release of Rt glutes/prifiromis with increase hip IR/ER.  PLAN/RECOMMENDATIONS:   Plan for treatment: therapy treatment to continue next visit.  Planned interventions for next visit: continue with current treatment.

## 2021-04-22 ENCOUNTER — APPOINTMENT (OUTPATIENT)
Dept: PHYSICAL THERAPY | Facility: REHABILITATION | Age: 41
End: 2021-04-22
Payer: OTHER GOVERNMENT

## 2021-04-22 ENCOUNTER — PHYSICAL THERAPY (OUTPATIENT)
Dept: PHYSICAL THERAPY | Facility: REHABILITATION | Age: 41
End: 2021-04-22
Attending: FAMILY MEDICINE
Payer: OTHER GOVERNMENT

## 2021-04-22 DIAGNOSIS — M25.551 RIGHT HIP PAIN: ICD-10-CM

## 2021-04-22 PROCEDURE — 97110 THERAPEUTIC EXERCISES: CPT

## 2021-04-22 NOTE — OP THERAPY DAILY TREATMENT
"  Outpatient Physical Therapy  DAILY TREATMENT     [facility] Physical [address].  [address]  [place] [address]  Phone:  [phone]  Fax:  [phone]    Date: 04/22/2021    Patient: Darren Watters  YOB: 1980  MRN: [id number]     Time Calculation    Start time: 1531  Stop time: 1559 Time Calculation (min): 28 minutes         Chief Complaint: Hip Injury    Visit #: 3    SUBJECTIVE:  Pt reported some return of symptoms. Minimal \"pre numbness\" type sensations. Feels it is probably just the soreness from last session.    OBJECTIVE:      Therapeutic Exercises (CPT 49583):     1. Sidelying abd/ext holds GTB @ ankles, 3 x 60 bilateral    2. Clamshell holds OTB, 0    3. Monster walks GTB @ ankle, x 2min fwd/bwd    4. Lateral stepping GTB @ ankle, 0, cues for glute activation and dec Rt hip ER    5. Hip slider burnouts bilateral, 3 min    6. Fire hydrant holds, 1 min x 3 bilat, quadruped, cues for hip rotation/trunk    20. 6/14/21      Therapeutic Exercise Summary:  HEP: supine glute stretch, supine piriformis stretch, side lying ab/ext holds 3 x 60 OTB, side lying clamshell holds 3 x 60 OTB        Therapeutic Treatments and Modalities:     1. Manual Therapy (CPT 36766), Rt glute/piriformis    Therapeutic Treatment and Modalities Summary: Manual: Trp release with hip in ER targeting glutes and piriformis. Positioned in Prone. Improved hip ER/IR post intervention and pt dec in pain.    Time-based treatments/modalities:    Physical Therapy Timed Treatment Charges  Therapeutic exercise minutes (CPT 11461): 28 minutes      ASSESSMENT:   Pt demo'd fair difficulty performing hip slider burnouts with increased difficulty with left LE as stance limb. Mod vc to decrease trunk rotation with fire hydrant holds. Encouraged continued emphasis on stretching portion of HEP. Educated on dry needling techniques as pt is interested and will be seeing other therapist next session. "     PLAN/RECOMMENDATIONS:   Plan for treatment: therapy treatment to continue next visit.  Planned interventions for next visit: continue with current treatment.

## 2021-04-27 ENCOUNTER — APPOINTMENT (OUTPATIENT)
Dept: PHYSICAL THERAPY | Facility: REHABILITATION | Age: 41
End: 2021-04-27
Payer: OTHER GOVERNMENT

## 2021-04-27 ENCOUNTER — PHYSICAL THERAPY (OUTPATIENT)
Dept: PHYSICAL THERAPY | Facility: REHABILITATION | Age: 41
End: 2021-04-27
Attending: FAMILY MEDICINE
Payer: OTHER GOVERNMENT

## 2021-04-27 DIAGNOSIS — M25.551 RIGHT HIP PAIN: ICD-10-CM

## 2021-04-27 PROCEDURE — 97110 THERAPEUTIC EXERCISES: CPT

## 2021-04-27 NOTE — OP THERAPY DAILY TREATMENT
Outpatient Physical Therapy  DAILY TREATMENT     [facility] Physical [address].  [address]  [place] [address]  Phone:  [phone]  Fax:  [phone]    Date: 04/27/2021    Patient: Darren Watters  YOB: 1980  MRN: [id number]     Time Calculation    Start time: 0902  Stop time: 0933 Time Calculation (min): 31 minutes         Chief Complaint: Hip Problem    Visit #: 4    SUBJECTIVE:  Pt reported some numbness on Saturday when driving to [place] to play golf. No symptoms while golfing or when driving home. Pt reports current symptoms very minimal. Pt saw chiropractor yesterday who suggested pt get an xray of the hip. Pt reported he is considering it just in case there is something else going on.    OBJECTIVE:  Decreased pain and improved ROM post session.     Therapeutic Exercises (CPT 09114):     1. Sidelying abd/ext holds PTB @ ankles, 3 x 60 bilateral    2. Clamshell holds OTB, 0    3. Monster walks PTB @ ankle, x 2min fwd/bwd    4. Lateral stepping PTB @ ankle, 2 min, cues for glute activation and dec Rt hip ER    5. Hip slider burnouts bilateral, 3 min    6. Fire hydrant holds, 0, quadruped, cues for hip rotation/trunk    20. 6/14/21      Therapeutic Exercise Summary:  HEP: supine glute stretch, supine piriformis stretch, side lying ab/ext holds 3 x 60 OTB, side lying clamshell holds 3 x 60 OTB        Therapeutic Treatments and Modalities:     1. Manual Therapy (CPT 27611), Rt glute/piriformis    Therapeutic Treatment and Modalities Summary: Manual: Trp release with hip in ER and IR targeting glutes and piriformis. Positioned in Prone. Improved hip ER/IR post intervention and pt dec in pain.    Time-based treatments/modalities:    Physical Therapy Timed Treatment Charges  Manual therapy minutes (CPT 27333): 5 minutes  Therapeutic exercise minutes (CPT 18951): 26 minutes      ASSESSMENT:     Educated pt he can talk to PCP if interested in imagining however at this time  his symptoms are improving with PT intervention any imaging would likely not change treatment POC. Pt is responding well to current treatment interventions reporting less pain and less frequent numbness/pain in LE for sitting for extended periods. Progressed interventions during session to include higher resistance with good stability. Briefly reviewed glute/piriformis stretching for HEP. Will utilize functional dry needling next session for pain management.       PLAN/RECOMMENDATIONS:   Plan for treatment: therapy treatment to continue next visit.  Planned interventions for next visit: continue with current treatment.

## 2021-04-29 ENCOUNTER — APPOINTMENT (OUTPATIENT)
Dept: PHYSICAL THERAPY | Facility: REHABILITATION | Age: 41
End: 2021-04-29
Payer: OTHER GOVERNMENT

## 2021-04-29 ENCOUNTER — PHYSICAL THERAPY (OUTPATIENT)
Dept: PHYSICAL THERAPY | Facility: REHABILITATION | Age: 41
End: 2021-04-29
Attending: FAMILY MEDICINE
Payer: OTHER GOVERNMENT

## 2021-04-29 DIAGNOSIS — M25.551 RIGHT HIP PAIN: ICD-10-CM

## 2021-04-29 PROCEDURE — 97110 THERAPEUTIC EXERCISES: CPT

## 2021-04-29 PROCEDURE — 97014 ELECTRIC STIMULATION THERAPY: CPT

## 2021-04-29 NOTE — OP THERAPY DAILY TREATMENT
Outpatient Physical Therapy  DAILY TREATMENT     [facility] Physical [address]  [address]  [place] [address]  Phone:  [phone]  Fax:  [phone]    Date: 04/29/2021    Patient: Darren Watters  YOB: 1980  MRN: [id number]     Time Calculation    Start time: 1600  Stop time: 1632 Time Calculation (min): 32 minutes         Chief Complaint: Hip Problem    Visit #: 5    SUBJECTIVE:  Pt provided information related to dry needling, educated, and signed consent form.    OBJECTIVE:  Decreased pain and improved ROM post session.     Therapeutic Exercises (CPT 23833):     1. Sidelying abd/ext holds PTB @ ankles, 3 x 60 bilateral    2. Clamshell holds OTB, 0    3. Monster walks PTB @ ankle, 0    4. Lateral stepping PTB @ ankle, 0, cues for glute activation and dec Rt hip ER    5. Hip slider burnouts bilateral, 3 min    6. Fire hydrant holds, 2 x 60 sec PTB @ ankles, quadruped, cues for hip rotation/trunk    7. Supine glute stretch , 2 x60    20. 6/14/21      Therapeutic Exercise Summary:  HEP: supine glute stretch, supine piriformis stretch, side lying ab/ext holds 3 x 60 OTB, side lying clamshell holds 3 x 60 OTB        Therapeutic Treatments and Modalities:     1. Manual Therapy (CPT 45676), Rt glute/piriformis    Therapeutic Treatment and Modalities Summary: Manual: Functional dry needling completed to right glute med, glute max, piriformis. 3 x 60mm needles used. Needles were all removed at end of interventions and attended throughout treatment. Pt was educated on effects and potential side effects related to dry needling and consents were signed 4/29/2021.     Time-based treatments/modalities:    Physical Therapy Timed Treatment Charges  Manual therapy minutes (CPT 29472): 5 minutes  Therapeutic exercise minutes (CPT 47345): 24 minutes      ASSESSMENT:     Pt responded appropriately to functional dry needling of Rt glute/pirifromis. Appropriate muscle twitch response  occurred. No bleeding or bruising observed after treatment. Pt did demo good rt ab/ext fatigue post needling as compared to previous sessions as noted with significant difficulty with reps 2/3 hip abd/ext holds which previously were min/mild difficulty. Pt will continue to benefit from continued skilled intervention in order to decrease pain and improve strength to return to PLOF.       PLAN/RECOMMENDATIONS:   Plan for treatment: therapy treatment to continue next visit.  Planned interventions for next visit: continue with current treatment.

## 2021-05-04 ENCOUNTER — PHYSICAL THERAPY (OUTPATIENT)
Dept: PHYSICAL THERAPY | Facility: REHABILITATION | Age: 41
End: 2021-05-04
Attending: FAMILY MEDICINE
Payer: OTHER GOVERNMENT

## 2021-05-04 DIAGNOSIS — M25.551 RIGHT HIP PAIN: ICD-10-CM

## 2021-05-04 PROCEDURE — 97110 THERAPEUTIC EXERCISES: CPT

## 2021-05-04 PROCEDURE — 97140 MANUAL THERAPY 1/> REGIONS: CPT

## 2021-05-04 NOTE — OP THERAPY DAILY TREATMENT
"  Outpatient Physical Therapy  DAILY TREATMENT     [facility] Physical Therapy [address].  [address]  [place] [address]  Phone:  [phone]  Fax:  [phone]    Date: 05/04/2021    Patient: Darren Watters  YOB: 1980  MRN: [id number]     Time Calculation    Start time: 1533  Stop time: 1600 Time Calculation (min): 27 minutes         Chief Complaint: Hip Injury    Visit #: 6    SUBJECTIVE:  Pt reported 1 full day no pain after last session, overall improvement as he usually has residual symptoms at best.     OBJECTIVE:  Improved hip IR post interventions  Decreased pain post intervention          Therapeutic Exercises (CPT 65424):     1. Hip slider burnout, x 3 min, 4# med ball 90 deg UE flexion    2. Clamshell side plank holds, 3 x 60 bilateral    3. Glute stretch Rt LE, 3 x 60    Therapeutic Treatments and Modalities:     Therapeutic Treatment and Modalities Summary: Manual:Patient agrees to risks and benefits associated with Dry Needling, written consent signed.  Patients skin cleaned and prepped according to protocol.  Muscles needled Right glute min, glute med, glute max 3 x 75mm needles used.  No adverse effects noted post treatment, all needles removed.    STM Rt glutes/piriformis and TRP release 4 x 60 seconds with mobilization of hip ER./IR. Pt most tender with hip IR in glutes. Good decrease in pain post session and improved ROM post test.      Time-based treatments/modalities:    Physical Therapy Timed Treatment Charges  Manual therapy minutes (CPT 96844): 10 minutes  Therapeutic exercise minutes (CPT 21924): 17 minutes      ASSESSMENT:   Pt responded well to manual intervention with improved ROM and decreased pain/cramping symptoms. Did remind pt importance of HEP compliance with emphasis on stretching as he reported only stretching when \"needing it.\" Educated on importance of 5 min total stretching per day for permanent length change in muscle. Able to progress hip " slides burnouts with 4# med ball placed in UE flexion for increased trunk ext/stabilzation from glutes with good fatigue. Also progressed clamshells into plank on knees position for greater core/hip recruitment. Mod cues to dec use of ankle position for stability. .    PLAN/RECOMMENDATIONS:   Plan for treatment: therapy treatment to continue next visit.  Planned interventions for next visit: continue with current treatment.

## 2021-05-06 ENCOUNTER — PHYSICAL THERAPY (OUTPATIENT)
Dept: PHYSICAL THERAPY | Facility: REHABILITATION | Age: 41
End: 2021-05-06
Attending: FAMILY MEDICINE
Payer: OTHER GOVERNMENT

## 2021-05-06 DIAGNOSIS — M25.551 RIGHT HIP PAIN: ICD-10-CM

## 2021-05-06 PROCEDURE — 97110 THERAPEUTIC EXERCISES: CPT

## 2021-05-06 NOTE — OP THERAPY DAILY TREATMENT
Outpatient Physical Therapy  DAILY TREATMENT     [facility] Physical [address]  [address]  [place] [address]  Phone:  [phone]  Fax:  [phone]    Date: 05/06/2021    Patient: Darren Watters  YOB: 1980  MRN: [id number]     Time Calculation    Start time: 1528  Stop time: 1600 Time Calculation (min): 32 minutes         Chief Complaint: Hip Problem    Visit #: 7    SUBJECTIVE:  Pt reported good resolution of symptoms last session. Reported symptoms as barely noticeable.  Pt received covid vaccine yesterday and has mild body aches.    OBJECTIVE:  Improved hip IR post interventions        Therapeutic Exercises (CPT 93089):     1. Hip slider burnout, x 4 min, 4# med ball 90 deg UE flexion    2. Clamshell side plank holds, 2 x 60 bilateral    3. Glute stretch Rt LE, 3 x 60    4. Fire hydrant holds, 3 x 60 sec bilateral, OTB at knees    20. 6/14/21, 30 day 5/15      Therapeutic Exercise Summary: HEP:supine glute stretch, supine piriformis stretch, side lying ab/ext holds 3 x 60 OTB, side lying clamshell holds 3 x 60 OTB    Therapeutic Treatments and Modalities:     1. Manual Therapy (CPT 73496), see below    Therapeutic Treatment and Modalities Summary: Manual:  STM Rt glutes/piriformis and TRP release 3 x 60 seconds with mobilization of hip ER./IR. Pt most tender with hip IR in glutes. Good decrease in pain post session and improved ROM post test.      Time-based treatments/modalities:    Physical Therapy Timed Treatment Charges  Manual therapy minutes (CPT 90521): 4 minutes  Therapeutic exercise minutes (CPT 27125): 28 minutes      ASSESSMENT:   Minimal manual completed as pt had received Covid vaccine less than 24 hours prior, he verbalized understanding. Pt continues to tolerate progressions well with appropriate fatigue and overload of glutes with improvements in chronic symptoms. Pt will continue to benefit from skilled intervention in order to decrease pain and  return to PLOF.    PLAN/RECOMMENDATIONS:   Plan for treatment: therapy treatment to continue next visit.  Planned interventions for next visit: continue with current treatment.

## 2021-05-11 ENCOUNTER — PHYSICAL THERAPY (OUTPATIENT)
Dept: PHYSICAL THERAPY | Facility: REHABILITATION | Age: 41
End: 2021-05-11
Attending: FAMILY MEDICINE
Payer: OTHER GOVERNMENT

## 2021-05-11 DIAGNOSIS — M25.551 RIGHT HIP PAIN: ICD-10-CM

## 2021-05-11 PROCEDURE — 97110 THERAPEUTIC EXERCISES: CPT

## 2021-05-11 PROCEDURE — 97140 MANUAL THERAPY 1/> REGIONS: CPT

## 2021-05-11 NOTE — OP THERAPY DAILY TREATMENT
"  Outpatient Physical Therapy  DAILY TREATMENT     [facility] [address].  [address]  [place] [address]  Phone:  [phone]  Fax:  [phone]    Date: 05/11/2021    Patient: Darren Watters  YOB: 1980  MRN: [id number]     Time Calculation    Start time: 1600  Stop time: 1631 Time Calculation (min): 31 minutes         Chief Complaint: Hip Problem    Visit #: 8    SUBJECTIVE:  Pt frustrated after increase in symptoms over the weekend. Pt drove to [employer], 1 hour, played gold and drove back with increased symptoms as well as golfing the next day. Pt \"somehwat\" compliant with HEP with glute stretches but inconsistent.    OBJECTIVE:  Decreased pain post intervention          Therapeutic Exercises (CPT 24611):     1. Hip slider burnout, x 4 min, 6# med ball 90 deg UE flexion    2. SL hip ab/ext Pink TB, 3 x 60 bilateral    3. Glute stretch Rt LE, 2x 60    4. [place] stretch, x 60    5. Prone fig 4 lifts, 20    20. Poc 6/14, 30 day 5/15      Therapeutic Exercise Summary: HEP: added pigeon, quadruped fire hydrant holds 3 x 1, prone fig 4 lifts x 30, continued glute stretches.    Therapeutic Treatments and Modalities:     1. Manual Therapy (CPT 12485), see below    Therapeutic Treatment and Modalities Summary: Manual:Patient agrees to risks and benefits associated with Dry Needling, written consent signed.  Patients skin cleaned and prepped according to protocol.  Muscles needled Right glute min, glute med, glute max 3 x 75mm needles used.  Estim utilized to glute x 2 min via needles.   No adverse effects noted post treatment, all needles removed.    STM Rt glutes/piriformis and TRP release 4 x 60 seconds with mobilization of hip ER./IR. Pt most tender with hip IR in glutes. Good decrease in pain post session and improved ROM post test.      Time-based treatments/modalities:    Physical Therapy Timed Treatment Charges  Manual therapy minutes (CPT 30468): 13 " minutes  Therapeutic exercise minutes (CPT 69574): 18 minutes      ASSESSMENT:   Educated pt that increase in symptoms may be related to significant change in activity. Prior to this weekend pt had not been driving as far, so frequently or playing golf two days in [place]. Re-educated on importance with HEP compliance including strengthening as he has only been completing stretching. Pt does participate in cross fit however educated on importance of specifically targeting the glute med/min/max. Pt verbalized understanding.Will continue to progress POC to include further glute strengthening in weight bearing position.   PLAN/RECOMMENDATIONS:   Plan for treatment: therapy treatment to continue next visit.  Planned interventions for next visit: continue with current treatment.    SL squats TRX, standing iso holds, step ups

## 2021-05-13 ENCOUNTER — PHYSICAL THERAPY (OUTPATIENT)
Dept: PHYSICAL THERAPY | Facility: REHABILITATION | Age: 41
End: 2021-05-13
Attending: FAMILY MEDICINE
Payer: OTHER GOVERNMENT

## 2021-05-13 DIAGNOSIS — M25.551 RIGHT HIP PAIN: ICD-10-CM

## 2021-05-13 PROCEDURE — 97110 THERAPEUTIC EXERCISES: CPT

## 2021-05-13 PROCEDURE — 97140 MANUAL THERAPY 1/> REGIONS: CPT

## 2021-05-13 NOTE — OP THERAPY DAILY TREATMENT
Outpatient Physical Therapy  DAILY TREATMENT     [facility] [address].  [address]  [place] [address]  Phone:  [phone]  Fax:  [phone]    Date: 05/13/2021    Patient: Darren Watters  YOB: 1980  MRN: [id number]     Time Calculation    Start time: 1433  Stop time: 1500 Time Calculation (min): 27 minutes         Chief Complaint: Hip Problem    Visit #: 9    SUBJECTIVE:  Pt reported some soreness last night while stretching glutes. Reports very cramping like but improved within a few min. Reports he has not been compliant with strengthening portion of HEP and inconsistent with stretching. No pain today.    OBJECTIVE:  Decreased pain post intervention          Therapeutic Exercises (CPT 10173):     1. Hip slider burnout, 0, 6# med ball 90 deg UE flexion    2. SL hip ab/ext Pink TB, 0    3. Glute stretch Rt LE, 3 x 60    4. [place] stretch, 0    5. Prone fig 4 lifts, 20    6. Hip AB tanding iso hold, 3 x 1 B, captain hold, therapy ball    7. Airplanes on BOSU, 2 x 1 min B    20. Poc 6/14, 30 day 5/15      Therapeutic Exercise Summary: HEP: added pigeon, quadruped fire hydrant holds 3 x 1, prone fig 4 lifts x 30, continued glute stretches.    Therapeutic Treatments and Modalities:     1. Manual Therapy (CPT 58858), see below    Therapeutic Treatment and Modalities Summary: Manual:Patient agrees to risks and benefits associated with Dry Needling, written consent signed.  Patients skin cleaned and prepped according to protocol.  Muscles needled Right glute min, glute med, glute max 3 x 75mm needles used.  Estim utilized to glute x 2 min via needles.   No adverse effects noted post treatment, all needles removed.    STM Rt glutes/piriformis and TRP release 4 x 60 seconds with mobilization of hip ER./IR. Pt most tender with hip IR in glutes. Good decrease in pain post session and improved ROM post test.      Time-based treatments/modalities:    Physical Therapy Timed  Treatment Charges  Manual therapy minutes (CPT 46666): 14 minutes  Therapeutic exercise minutes (CPT 80009): 13 minutes      ASSESSMENT:   Re-educated on importance of glute specific warm up prior to cross fit. Pt participates in group warm up for class but was reminded that his pain would likely improve with a glute specific warm up and more compliance with strengthening portions of HEP.      PLAN/RECOMMENDATIONS:   Plan for treatment: therapy treatment to continue next visit.  Planned interventions for next visit: continue with current treatment.    SL squats TRX, standing iso holds, step ups

## 2021-05-18 ENCOUNTER — PHYSICAL THERAPY (OUTPATIENT)
Dept: PHYSICAL THERAPY | Facility: REHABILITATION | Age: 41
End: 2021-05-18
Attending: FAMILY MEDICINE
Payer: OTHER GOVERNMENT

## 2021-05-18 DIAGNOSIS — M25.551 RIGHT HIP PAIN: ICD-10-CM

## 2021-05-18 PROCEDURE — 97140 MANUAL THERAPY 1/> REGIONS: CPT

## 2021-05-18 PROCEDURE — 97110 THERAPEUTIC EXERCISES: CPT

## 2021-05-18 NOTE — OP THERAPY PROGRESS SUMMARY
Outpatient Physical Therapy  PROGRESS SUMMARY NOTE      [facility] [address]  [address]  [place] [address]  Phone:  [phone]  Fax:  [phone]    Date of Visit: 05/18/2021    Patient: Darren Watters  YOB: 1980  MRN: [id number]     Referring Provider: Renata Hernandez M.D.  [address]  [address]  [place] [address]   Referring Diagnosis Right hip pain [M25.551]     Visit Diagnoses     ICD-10-CM   1. Right hip pain  M25.551       Rehab Potential: excellent    Progress Report Period:4/15/21-5/18/21    Functional Assessment Used    MMT hip ext Rt 4+/5      Objective Findings and Assessment:   Patient progression towards goals: 1. Pt will report compliance with HEP 5-7x per week in order to improve flexibly and strength. Pt noncompliant with strengthening portion of HEP and inconsistent overall.  2. Pt will subjectively report pain at worst 5/10 or better when standing more than 20 in. Pt reported ability to stand/ambulate while golfing more than 1 hour over weekend pain 3/10. GOAL MET  GOAL UPGRADE 5/18/21 Pt will report ability to sit 45 minutes followed by 1 hour of activity, walking/golf, with pain 1/10 or better.         Long Term Goals:    1. Pt will subjectively report ability to sit in car for 1 hour with subjective pain 3/10 or less. Pt able to sit x 40 minutes with pain 3/10 over the weekend, however last weekend significant increase in pain driving >1 hour.   2. Pt will demonstrate improve glute strength via MMT 4+/5 or better to appropriately utilize glutes while performing cross fit activities. GOAL MET.  GOAL UPGRADE 5/18/21: Pt will demonstrate hip extension strength 5/5 via MMT in order to appropriately activate glutes during cross fit activities.   3. Pt will demonstrate improved bilateral HS length via 90-90 test lacking 30 degrees or less in order to improve squat mechanics. Goal DC-Pt noncompliant with HS stretching.    Objective  findings and assessment details: Overall, pt dmeonstrates significant improvement with decrease in chronic hip pain and associated symptoms including pain/nyumbness with prolonged sitting or standing. Pt did recently have exacerbation of symptoms last weekend with pain reported 7/10 however this is likely due to pt noncompliance with HEP. Significant time was spent educating on importance of HEP last week with pt symptoms improving with similar activities as exacerbation however again pt was inconsistent. Today, reminded pt importance of HEP compliance for max benefit. Pt demonstrates improved glute strength and stabilty but continues to have glute max/med weakness impacting function and contributing to pain. Pt will benefit from continued PT 1xwk for 6 weeks, total 16 visits, to begin to wean from manual intervention and more heavily rely on pt accountability with completion of HEP. Pt verbalized understanding and in agreement to complete HEP as prescribed.         Goals:   Short Term Goals:   1. Pt will report compliance with HEP 5-7x per week in order to improve flexibly and strength.  2. Pt will report ability to sit 45 minutes followed by 1 hour of activity, walking/golf, with pain 1/10 or better.  Short term goal time span:  2-4 weeks      Long Term Goals:    1. Pt will subjectively report ability to sit in car for 1 hour with subjective pain 3/10 or less.  2. Pt will demonstrate hip extension strength 5/5 via MMT in order to appropriately activate glutes during cross fit activities.   Long term goal time span:  4-6 weeks    Plan:   Planned therapy interventions:  Manual Therapy (CPT 06012), Neuromuscular Re-education (CPT 40059), Therapeutic Exercise (CPT 76457) and Therapeutic Activities (CPT 60141)  Frequency:  1x week  Duration in weeks:  6  Plan details:  1 xweek for 6 weeks.  Total visits 16, asking for 4 more visits after auth of 12 visits      Referring provider co-signature:  I have reviewed this plan  of care and my co-signature certifies the need for services.     Certification Period: 05/18/2021 to 06/29/21    Physician Signature: ________________________________ Date: ______________

## 2021-05-18 NOTE — OP THERAPY DAILY TREATMENT
Outpatient Physical Therapy  DAILY TREATMENT     [facility] [address]  [address]  [place] [address]  Phone:  [phone]  Fax:  [phone]    Date: 05/18/2021    Patient: Darren Watters  YOB: 1980  MRN: [id number]     Time Calculation    Start time: 1600  Stop time: 1630 Time Calculation (min): 30 minutes         Chief Complaint: Hip Injury    Visit #: 10    SUBJECTIVE:  Pt reported improved ability to sit in car for 40 min as well as play golf without pain. Pt completed HEP on  Friday however has not completed since. Pt reports ability to golf walk for more than 1 hour with pain 3/10.    OBJECTIVE:  Pain 3/10 after golf this weekend, 7/10 last week after golf/drive approx 1 hr.  Glutes MMT: 4+/5 Rt          Therapeutic Exercises (CPT 62794):     1. Hip slider burnout, 0, 6# med ball 90 deg UE flexion    2. SL hip ab/ext Pink TB, 0    3. Glute stretch Rt LE, 3 x 60    4. [place] stretch, 0    5. Prone fig 4 lifts    6. Standing fire hydrant hold, 3 x 1 B, GTB @ knees    7. Airplanes on BOSU, 3 x 1 min B    20. Poc 6/14, 30 day 5/15      Therapeutic Exercise Summary: HEP: added pigeon, quadruped fire hydrant holds 3 x 1, prone fig 4 lifts x 30, continued glute stretches.    Therapeutic Treatments and Modalities:     1. Manual Therapy (CPT 55618), see below    Therapeutic Treatment and Modalities Summary: Manual:Patient agrees to risks and benefits associated with Dry Needling, written consent signed.  Patients skin cleaned and prepped according to protocol.  Muscles needled Right glute min, glute med, glute max, piriformis 5 x 75mm needles used.  No adverse effects noted post treatment, all needles removed.    STM Rt glutes/piriformis and TRP release 4 x 60 seconds with mobilization of hip ER./IR. Pt most tender with hip IR in glutes. Good decrease in pain post session and improved ROM post test. Overall dec in symptoms during manual intervention as compared to  previous session      Time-based treatments/modalities:    Physical Therapy Timed Treatment Charges  Manual therapy minutes (CPT 69752): 13 minutes  Therapeutic exercise minutes (CPT 21586): 17 minutes      ASSESSMENT:   Overall, pt demonstrates significant improvement with decrease in chronic hip pain and associated symptoms including pain/nyumbness with prolonged sitting or standing. Pt did recently have exacerbation of symptoms last weekend with pain reported 7/10 however this is likely due to pt noncompliance with HEP. Significant time was spent educating on importance of HEP last week with pt symptoms improving with similar activities as exacerbation however again pt was inconsistent. Today, reminded pt importance of HEP compliance for max benefit. Pt demonstrates improved glute strength and stabilty but continues to have glute max/med weakness impacting function and contributing to pain. Pt will benefit from continued PT 1xwk for 6 weeks, total 16 visits, to begin to wean from manual intervention and more heavily rely on pt accountability with completion of HEP. Pt verbalized understanding and in agreement to complete HEP as prescribed.       1. Pt will report compliance with HEP 5-7x per week in order to improve flexibly and strength. Pt noncompliant with strengthening portion of HEP and inconsistent overall.  2. Pt will subjectively report pain at worst 5/10 or better when standing more than 20 in. Pt reported ability to stand/ambulate while golfing more than 1 hour over weekend pain 3/10. GOAL MET  GOAL UPGRADE 5/18/21 Pt will report ability to sit 45 minutes followed by 1 hour of activity, walking/golf, with pain 1/10 or better.   Short term goal time span:  2-4 weeks      Long Term Goals:    1. Pt will subjectively report ability to sit in car for 1 hour with subjective pain 3/10 or less. Pt able to sit x 40 minutes with pain 3/10 over the weekend, however last weekend significant increase in pain driving  >1 hour.   2. Pt will demonstrate improve glute strength via MMT 4+/5 or better to appropriately utilize glutes while performing cross fit activities. GOAL MET.  GOAL UPGRADE 5/18/21: Pt will demonstrate hip extension strength 5/5 via MMT in order to appropriately activate glutes during cross fit activities.   3. Pt will demonstrate improved bilateral HS length via 90-90 test lacking 30 degrees or less in order to improve squat mechanics. Goal DC-Pt noncompliant with HS stretching.      PLAN/RECOMMENDATIONS:   Plan for treatment: therapy treatment to continue next visit.  Planned interventions for next visit: continue with current treatment.

## 2021-05-25 ENCOUNTER — PHYSICAL THERAPY (OUTPATIENT)
Dept: PHYSICAL THERAPY | Facility: REHABILITATION | Age: 41
End: 2021-05-25
Attending: FAMILY MEDICINE
Payer: OTHER GOVERNMENT

## 2021-05-25 DIAGNOSIS — M25.551 RIGHT HIP PAIN: ICD-10-CM

## 2021-05-25 PROCEDURE — 97110 THERAPEUTIC EXERCISES: CPT

## 2021-05-25 NOTE — OP THERAPY DAILY TREATMENT
Outpatient Physical Therapy  DAILY TREATMENT     [facility] [address]  [address]  [place] [address]  Phone:  [phone]  Fax:  [phone]    Date: 05/25/2021    Patient: Darren Watters  YOB: 1980  MRN: [id number]     Time Calculation    Start time: 1600  Stop time: 1631 Time Calculation (min): 31 minutes         Chief Complaint: Hip Problem    Visit #: 11    SUBJECTIVE:  Pt reports good improvement with pain.  Able to sit and drive to/from [place] with very min symptoms.     OBJECTIVE:            Therapeutic Exercises (CPT 60460):     1. Hip slider burnout, 4 min, 6# med ball 90 deg UE flexion    2. SL hip ab/ext Pink TB, 0    3. Glute stretch Rt LE, 3 x 60    4. Box step ups, 90 sec B, tall box    5. Single LE squat TRX, 2 x 20 B, GTB above knees nwb hip ext/ab    6. Standing fire hydrant hold, 3 x 1 B, GTB @ knees    7. Airplanes on BOSU, 3min B    20. Poc 6/14, 30 day 5/15      Therapeutic Exercise Summary: HEP: added pigeon, quadruped fire hydrant holds 3 x 1, prone fig 4 lifts x 30, continued glute stretches.      Time-based treatments/modalities:    Physical Therapy Timed Treatment Charges  Therapeutic exercise minutes (CPT 29221): 31 minutes      ASSESSMENT:   With pt report of improvement extra visits may not be necessary. Will see as pt will return in 2 weeks for follow up. If pain is still manageable may be ready for DC. Discussed with pt who verbalized agreement. Pt tolerated progression of CKC strengthening without pain. Mod cues for eccentric awareness with step downs on tall box.      1. Pt will report compliance with HEP 5-7x per week in order to improve flexibly and strength. Pt noncompliant with strengthening portion of HEP and inconsistent overall.  2. Pt will subjectively report pain at worst 5/10 or better when standing more than 20 in. Pt reported ability to stand/ambulate while golfing more than 1 hour over weekend pain 3/10. GOAL  MET  GOAL UPGRADE 5/18/21 Pt will report ability to sit 45 minutes followed by 1 hour of activity, walking/golf, with pain 1/10 or better.   Short term goal time span:  2-4 weeks      Long Term Goals:    1. Pt will subjectively report ability to sit in car for 1 hour with subjective pain 3/10 or less. Pt able to sit x 40 minutes with pain 3/10 over the weekend, however last weekend significant increase in pain driving >1 hour.   2. Pt will demonstrate improve glute strength via MMT 4+/5 or better to appropriately utilize glutes while performing cross fit activities. GOAL MET.  GOAL UPGRADE 5/18/21: Pt will demonstrate hip extension strength 5/5 via MMT in order to appropriately activate glutes during cross fit activities.   3. Pt will demonstrate improved bilateral HS length via 90-90 test lacking 30 degrees or less in order to improve squat mechanics. Goal DC-Pt noncompliant with HS stretching.      PLAN/RECOMMENDATIONS:   Plan for treatment: therapy treatment to continue next visit.  Planned interventions for next visit: continue with current treatment.

## 2021-06-12 ENCOUNTER — PATIENT MESSAGE (OUTPATIENT)
Dept: MEDICAL GROUP | Facility: MEDICAL CENTER | Age: 41
End: 2021-06-12

## 2021-06-12 DIAGNOSIS — J30.9 ALLERGIC RHINITIS, UNSPECIFIED SEASONALITY, UNSPECIFIED TRIGGER: ICD-10-CM

## 2021-06-14 NOTE — TELEPHONE ENCOUNTER
From: Cortez Watters  To: Physician Renata Hernandez  Sent: 6/12/2021 1:44 PM PDT  Subject: Procedure Question    Good Afternoon,    I received a letter from [facility] which said that I need a new authorization for office visits (Dr Maria Reyes). I think the allergy injections are still good?    Thank you,  Cortez Watters

## 2021-07-08 ENCOUNTER — TELEPHONE (OUTPATIENT)
Dept: PHYSICAL THERAPY | Facility: REHABILITATION | Age: 41
End: 2021-07-08

## 2021-07-08 NOTE — OP THERAPY DISCHARGE SUMMARY
Outpatient Physical Therapy  DISCHARGE SUMMARY NOTE      [facility] [address]  [address]  [place] [address]  Phone:  [phone]  Fax:  [phone]    Date of Visit: 07/08/2021    Patient: Darren Watters  YOB: 1980  MRN: [id number]     Referring Provider: Renata Hernandez   Referring Diagnosis Right hip pain         Functional Assessment Used        Your patient is being discharged from Physical Therapy with the following comments:   · Patient has failed to schedule or reschedule follow-up visits    Comments:  Pt last seen by PT on 5/25. Had been doing very well with min/no symptoms. Pt has not made follow up appointments and appropriate to DC.    Limitations Remaining:  Unable to assess    Recommendations:  MELISSA Leroy, PT, DPT    Date: 7/8/2021

## 2022-11-09 NOTE — ANESTHESIA PROCEDURE NOTES
Airway  Date/Time: 1/7/2020 8:27 AM  Performed by: Boubacar Valdivia M.D.  Authorized by: Boubacar Valdivia M.D.     Location:  OR  Urgency:  Elective  Indications for Airway Management:  Anesthesia  Spontaneous Ventilation: absent    Sedation Level:  Deep  Preoxygenated: Yes    Patient Position:  Sniffing  Mask Difficulty Assessment:  0 - not attempted  Final Airway Type:  Endotracheal airway  Final Endotracheal Airway:  ETT and RICHARD tube  Cuffed: Yes    Technique Used for Successful ETT Placement:  Direct laryngoscopy  Insertion Site:  Oral  Blade Type:  Brandi  Laryngoscope Blade/Videolaryngoscope Blade Size:  4  ETT Size (mm):  7.5  Measured from:  Lips  Placement Verified by: auscultation and capnometry    Cormack-Lehane Classification:  Grade I - full view of glottis  Number of Attempts at Approach:  1         done

## 2024-03-05 NOTE — TELEPHONE ENCOUNTER
February 29, 2024    Patient Name: Yani Moore  Surgeon Name: Mitch Ambriz M.D.  Surgery Facility: [facility] ([address] [place] [address])  Surgery Date: 3/5/2024    The time of your surgery is not final and may change up to and until the day of your surgery. You will be contacted 24-48 hours prior to your surgery date with your check-in and surgery time.    If you will not be at one of the below numbers please call the surgery scheduler at [phone]  Preferred Phone: [phone]    BEFORE YOUR SURGERY   Pre Registration and/or Lab Work must be done within and no earlier than 28 days prior to your surgery date. Your scheduled facility will contact you regarding all required preregistration and/or lab work. If you have not been contacted within 7 days of your scheduled procedure please call [facility] at [phone] for an appointment as soon as possible.      DAY OF YOUR SURGERY  Nothing to eat or drink after midnight     Refrain from smoking any substance after midnight prior to surgery. Smoking may interfere with the anesthetic and frequently produces nausea during the recovery period.    Continue taking all lifesaving medications. Including the morning of your surgery with small sip of water.      Please do NOT take on the day of surgery:  Diuretics: examples- furosemide (Lasix), spironolactone, hydrochlorothiazide  ACE-inhibitors: examples- lisinopril, ramipril, enalapril  “ARBs”: examples- losartan, Olmesartan, valsartan    Please arrive at the hospital/surgery center at the check-in time provided.     An adult will need to bring you and take you home after your surgery.     AFTER YOUR SURGERY  Post op Appointment:   Date: 03/18/2024   Time: 02:15 PM   With: Mitch Ambriz MD   Location: [address] [place] [address]    - Post Surgery - You will need someone to drive you home  - Post Surgery - You will need someone to stay with you for 24 hours     TIME OFF  Patient called and is at Optometry appointment and is needing a referral to see Dr Sexton- regular check up. I explained to patient its a process that goes through our referral department. Referral pending   WORK  FMLA or Disability forms can be faxed directly to: [phone] or you may drop them off at [address] [place] [address]. Our office charges a $35.00 fee per form. Forms will be completed within 10 business days of drop off and payment received. For the status of your forms you may contact our disability office directly [phone].    MEDICATION INSTRUCTIONS **Please read section completely**  The following medications should be stopped a minimum of 10 days prior to surgery:  All over the counter, Supplements & Herbal medications    Anorectics: Phentermine (Adipex-P, Lomaira and Suprenza), Phentermine-topiramate (Qsymia), Bupropion-naltrexone (Contrave)    **If you are on Bupropion for anxiety/depression, please continue this medication up until the day of surgery.     Opiod Partial Agonists/Opioid Antagonists: Buprenorphine (Suboxone, Belbuca, Butrans, Probuphine Implant, Sublocade), Naltrexone (ReVia, Vivitrol), Naloxone    Amphetamines: Dextroamphetamine/Amphetamine (Adderall, Mydayis), Methylphenidate Hydrochloride (Concerta, Metadate, Methylin, Ritalin)    The following medications should be stopped 5 days prior to surgery:  Blood Thinners: Any Aspirin, Aspirin products, anti-inflammatories such as ibuprofen and any blood thinners such as Coumadin and Plavix. Please consult your prescribing physician if you are on life saving blood thinners, in regards to when to stop medications prior to surgery.     The following medications should be stopped a minimum of 3 days prior to surgery:  PDE-5 inhibitors: Sildenafil (Viagra), Tadalafil (Cialis), Vardenafil (Levitra), Avanafil (Stendra)    MAO Inhibitors: Rasagiline (Azilect), Selegiline (Eldepryl, Emsam, Selapar), Isocarboxazid (Marplan), Phenelzine (Nardil)

## (undated) DEVICE — SET LEADWIRE 5 LEAD BEDSIDE DISPOSABLE ECG (1SET OF 5/EA)

## (undated) DEVICE — SUCTION INSTRUMENT YANKAUER BULBOUS TIP W/O VENT (50EA/CA)

## (undated) DEVICE — LACTATED RINGERS INJ 1000 ML - (14EA/CA 60CA/PF)

## (undated) DEVICE — CLOSURE SKIN STRIP 1/2 X 4 IN - (STERI STRIP) (50/BX 4BX/CA)

## (undated) DEVICE — PROTECTOR ULNA NERVE - (36PR/CA)

## (undated) DEVICE — SUTURE 4-0 PLAIN GUT SC-1 ETHICON (36PK/BX)

## (undated) DEVICE — ELECTRODE 850 FOAM ADHESIVE - HYDROGEL RADIOTRNSPRNT (50/PK)

## (undated) DEVICE — TUBE CONNECTING SUCTION - CLEAR PLASTIC STERILE 72 IN (50EA/CA)

## (undated) DEVICE — SUTURE 5-0 PROL PS-3 (12PK/BX)

## (undated) DEVICE — SUTURE 5-0 CHROMIC GUT PS-5 - (12/BX) 18 INCH

## (undated) DEVICE — SHEATH SHARP SITE 30 DEGREE ENDOSCRUB II (5EA/PK)

## (undated) DEVICE — SUTURE 4-0 CHROMIC GUT - 18 INCH G-3 D/A (12/BX)

## (undated) DEVICE — PACK ENT OR - (2EA/CA)

## (undated) DEVICE — KIT ANESTHESIA W/CIRCUIT & 3/LT BAG W/FILTER (20EA/CA)

## (undated) DEVICE — ADHESIVE MASTISOL - (48/BX)

## (undated) DEVICE — SENSOR SPO2 NEO LNCS ADHESIVE (20/BX) SEE USER NOTES

## (undated) DEVICE — CANISTER SUCTION 3000ML MECHANICAL FILTER AUTO SHUTOFF MEDI-VAC NONSTERILE LF DISP  (40EA/CA)

## (undated) DEVICE — GOWN SURGEONS LARGE - (32/CA)

## (undated) DEVICE — BOVIE FOOT CONTROL SUCTION - 6IN 10FR (25EA/CA)

## (undated) DEVICE — SUTURE 5-0 PLAIN GUT PC-1 - (12/BX)

## (undated) DEVICE — TUBE E-T HI-LO CUFF 7.5MM (10EA/PK)

## (undated) DEVICE — KIT  I.V. START (100EA/CA)

## (undated) DEVICE — SUTURE GENERAL

## (undated) DEVICE — HEAD HOLDER JUNIOR/ADULT

## (undated) DEVICE — SUTURE 6-0 PROLENE P-3 - (12/BX)

## (undated) DEVICE — SPONGE TONSIL MEDIUM XRAY STERILE 1 - (5/PK 20PK/CA)"

## (undated) DEVICE — SET EXTENSION WITH 2 PORTS (48EA/CA) ***PART #2C8610 IS A SUBSTITUTE*****

## (undated) DEVICE — CANISTER SUCTION RIGID RED 1500CC (40EA/CA)

## (undated) DEVICE — MASK ANESTHESIA ADULT  - (100/CA)

## (undated) DEVICE — PENCIL ELECTSURG 10FT BTN SWH - (50/CA)

## (undated) DEVICE — TUBE NG SALEM SUMP 16FR (50EA/CA)

## (undated) DEVICE — BOVIE BLADE COATED &INSULATED - 25/PK

## (undated) DEVICE — PENCIL ELECTSURG 10FT HLSTR - WITH BLADE (50EA/CA)

## (undated) DEVICE — WATER IRRIGATION STERILE 1000ML (12EA/CA)

## (undated) DEVICE — ANTI-FOG SOLUTION - 60BTL/CA

## (undated) DEVICE — CATHETER IV 20 GA X 1-1/4 ---SURG.& SDS ONLY--- (50EA/BX)

## (undated) DEVICE — TUBING CLEARLINK DUO-VENT - C-FLO (48EA/CA)

## (undated) DEVICE — SUTURE FAST ABSORBANT 6-0 PLAIN GUT PC-1 (12PK/BX)

## (undated) DEVICE — ELECTRODE DUAL RETURN W/ CORD - (50/PK)

## (undated) DEVICE — SOLUTION NACL .9  150ML INJ

## (undated) DEVICE — TUBING ENDOSCRUB II (5EA/PK)

## (undated) DEVICE — SODIUM CHL IRRIGATION 0.9% 1000ML (12EA/CA)

## (undated) DEVICE — GLOVE SZ 6.5 BIOGEL PI MICRO - PF LF (50PR/BX)

## (undated) DEVICE — PATTIES SURG X-RAYCOTTONOID - 1/2 X 3 IN (200/CA)

## (undated) DEVICE — BOVIE NEEDLE TIP INSULATD NON-SAFETY 2CM (50/PK)

## (undated) DEVICE — CATHETER FOLEY ROBINSON 10FR 16IN STRL (12EA/CA)